# Patient Record
Sex: MALE | Race: WHITE | NOT HISPANIC OR LATINO | URBAN - METROPOLITAN AREA
[De-identification: names, ages, dates, MRNs, and addresses within clinical notes are randomized per-mention and may not be internally consistent; named-entity substitution may affect disease eponyms.]

---

## 2017-11-23 ENCOUNTER — EMERGENCY (EMERGENCY)
Facility: HOSPITAL | Age: 42
LOS: 1 days | Discharge: HOME | End: 2017-11-23
Attending: EMERGENCY MEDICINE | Admitting: EMERGENCY MEDICINE
Payer: COMMERCIAL

## 2017-11-23 VITALS
DIASTOLIC BLOOD PRESSURE: 93 MMHG | SYSTOLIC BLOOD PRESSURE: 152 MMHG | TEMPERATURE: 98 F | RESPIRATION RATE: 18 BRPM | HEART RATE: 98 BPM | OXYGEN SATURATION: 100 % | WEIGHT: 264.55 LBS

## 2017-11-23 DIAGNOSIS — R51 HEADACHE: ICD-10-CM

## 2017-11-23 DIAGNOSIS — R11.0 NAUSEA: ICD-10-CM

## 2017-11-23 PROCEDURE — 99284 EMERGENCY DEPT VISIT MOD MDM: CPT | Mod: 25

## 2017-11-23 PROCEDURE — 70450 CT HEAD/BRAIN W/O DYE: CPT | Mod: 26

## 2017-11-23 PROCEDURE — 70450 CT HEAD/BRAIN W/O DYE: CPT

## 2017-11-23 PROCEDURE — 99284 EMERGENCY DEPT VISIT MOD MDM: CPT

## 2017-11-23 NOTE — ED PROVIDER NOTE - MEDICAL DECISION MAKING DETAILS
41 yo male with int right sided headache ?visual aura.  normal exam.  pt requesting ct head.  does not want pain meds

## 2017-11-23 NOTE — ED ADULT TRIAGE NOTE - CHIEF COMPLAINT QUOTE
" I have a right sided headache for a month, getting worst and increasing in frequency and I have floaters".

## 2017-11-23 NOTE — ED ADULT NURSE NOTE - CHPI ED SYMPTOMS NEG
no dizziness/no weakness/no fever/no confusion/no numbness/no blurred vision/no loss of consciousness/no change in level of consciousness/no nausea/no vomiting

## 2017-11-23 NOTE — ED ADULT NURSE NOTE - OBJECTIVE STATEMENT
42y M, A&ox3, presents to ED c/o intermittent right sided headache over 1 month worsening. No head injury. No facial droop, no slurring of speech, strength and sensation equal bilateral. No cp, no sob, no fever, no chills. No photophobia, no neck tenderness. Will continue to monitor. 42y M, A&ox3, presents to ED c/o intermittent headache over 1 month worsening. No head injury. No facial droop, no slurring of speech, strength and sensation equal bilateral. No cp, no sob, no fever, no chills. No photophobia, no neck tenderness. Will continue to monitor. 42y M, A&ox3, presents to ED c/o intermittent right headache over 1 month worsening. Pt states at times sees "smokey" No blurred vision or changes in vision on arrival. No head injury. No facial droop, no slurring of speech, strength and sensation equal bilateral. No cp, no sob, no fever, no chills. No photophobia, no neck tenderness. NAD. Will continue to monitor.

## 2017-11-23 NOTE — ED PROVIDER NOTE - OBJECTIVE STATEMENT
right sided headache int for 1 month.  sometimes sees in right vision field looks like "smoke"  nausea, no vomiting

## 2017-11-26 ENCOUNTER — EMERGENCY (EMERGENCY)
Facility: HOSPITAL | Age: 42
LOS: 1 days | Discharge: HOME | End: 2017-11-26
Attending: EMERGENCY MEDICINE | Admitting: EMERGENCY MEDICINE
Payer: COMMERCIAL

## 2017-11-26 VITALS
RESPIRATION RATE: 18 BRPM | DIASTOLIC BLOOD PRESSURE: 96 MMHG | SYSTOLIC BLOOD PRESSURE: 143 MMHG | HEART RATE: 82 BPM | OXYGEN SATURATION: 98 %

## 2017-11-26 VITALS
WEIGHT: 265 LBS | RESPIRATION RATE: 18 BRPM | SYSTOLIC BLOOD PRESSURE: 168 MMHG | TEMPERATURE: 99 F | DIASTOLIC BLOOD PRESSURE: 112 MMHG | HEIGHT: 76 IN | OXYGEN SATURATION: 98 % | HEART RATE: 89 BPM

## 2017-11-26 DIAGNOSIS — Z79.899 OTHER LONG TERM (CURRENT) DRUG THERAPY: ICD-10-CM

## 2017-11-26 DIAGNOSIS — R51 HEADACHE: ICD-10-CM

## 2017-11-26 DIAGNOSIS — Z88.1 ALLERGY STATUS TO OTHER ANTIBIOTIC AGENTS STATUS: ICD-10-CM

## 2017-11-26 DIAGNOSIS — Z88.0 ALLERGY STATUS TO PENICILLIN: ICD-10-CM

## 2017-11-26 LAB
ALBUMIN SERPL ELPH-MCNC: 4.7 G/DL — SIGNIFICANT CHANGE UP (ref 3.3–5)
ALP SERPL-CCNC: 50 U/L — SIGNIFICANT CHANGE UP (ref 40–120)
ALT FLD-CCNC: 19 U/L — SIGNIFICANT CHANGE UP (ref 10–45)
ANION GAP SERPL CALC-SCNC: 16 MMOL/L — SIGNIFICANT CHANGE UP (ref 5–17)
AST SERPL-CCNC: 17 U/L — SIGNIFICANT CHANGE UP (ref 10–40)
BASOPHILS NFR BLD AUTO: 0.2 % — SIGNIFICANT CHANGE UP (ref 0–2)
BILIRUB SERPL-MCNC: 0.5 MG/DL — SIGNIFICANT CHANGE UP (ref 0.2–1.2)
BUN SERPL-MCNC: 13 MG/DL — SIGNIFICANT CHANGE UP (ref 7–23)
CALCIUM SERPL-MCNC: 9.4 MG/DL — SIGNIFICANT CHANGE UP (ref 8.4–10.5)
CHLORIDE SERPL-SCNC: 97 MMOL/L — SIGNIFICANT CHANGE UP (ref 96–108)
CO2 SERPL-SCNC: 26 MMOL/L — SIGNIFICANT CHANGE UP (ref 22–31)
CREAT SERPL-MCNC: 0.85 MG/DL — SIGNIFICANT CHANGE UP (ref 0.5–1.3)
EOSINOPHIL NFR BLD AUTO: 0.7 % — SIGNIFICANT CHANGE UP (ref 0–6)
GLUCOSE SERPL-MCNC: 93 MG/DL — SIGNIFICANT CHANGE UP (ref 70–99)
HCT VFR BLD CALC: 45 % — SIGNIFICANT CHANGE UP (ref 39–50)
HGB BLD-MCNC: 15.8 G/DL — SIGNIFICANT CHANGE UP (ref 13–17)
LYMPHOCYTES # BLD AUTO: 20.2 % — SIGNIFICANT CHANGE UP (ref 13–44)
MCHC RBC-ENTMCNC: 29.7 PG — SIGNIFICANT CHANGE UP (ref 27–34)
MCHC RBC-ENTMCNC: 35.1 G/DL — SIGNIFICANT CHANGE UP (ref 32–36)
MCV RBC AUTO: 84.6 FL — SIGNIFICANT CHANGE UP (ref 80–100)
MONOCYTES NFR BLD AUTO: 6.8 % — SIGNIFICANT CHANGE UP (ref 2–14)
NEUTROPHILS NFR BLD AUTO: 72.1 % — SIGNIFICANT CHANGE UP (ref 43–77)
PLATELET # BLD AUTO: 208 K/UL — SIGNIFICANT CHANGE UP (ref 150–400)
POTASSIUM SERPL-MCNC: 3.8 MMOL/L — SIGNIFICANT CHANGE UP (ref 3.5–5.3)
POTASSIUM SERPL-SCNC: 3.8 MMOL/L — SIGNIFICANT CHANGE UP (ref 3.5–5.3)
PROT SERPL-MCNC: 7.6 G/DL — SIGNIFICANT CHANGE UP (ref 6–8.3)
RBC # BLD: 5.32 M/UL — SIGNIFICANT CHANGE UP (ref 4.2–5.8)
RBC # FLD: 12.8 % — SIGNIFICANT CHANGE UP (ref 10.3–16.9)
SODIUM SERPL-SCNC: 139 MMOL/L — SIGNIFICANT CHANGE UP (ref 135–145)
WBC # BLD: 12.3 K/UL — HIGH (ref 3.8–10.5)
WBC # FLD AUTO: 12.3 K/UL — HIGH (ref 3.8–10.5)

## 2017-11-26 PROCEDURE — 36415 COLL VENOUS BLD VENIPUNCTURE: CPT

## 2017-11-26 PROCEDURE — 96375 TX/PRO/DX INJ NEW DRUG ADDON: CPT

## 2017-11-26 PROCEDURE — 99284 EMERGENCY DEPT VISIT MOD MDM: CPT | Mod: 25

## 2017-11-26 PROCEDURE — 80053 COMPREHEN METABOLIC PANEL: CPT

## 2017-11-26 PROCEDURE — 85025 COMPLETE CBC W/AUTO DIFF WBC: CPT

## 2017-11-26 PROCEDURE — 99284 EMERGENCY DEPT VISIT MOD MDM: CPT

## 2017-11-26 PROCEDURE — 96374 THER/PROPH/DIAG INJ IV PUSH: CPT

## 2017-11-26 RX ORDER — KETOROLAC TROMETHAMINE 30 MG/ML
30 SYRINGE (ML) INJECTION ONCE
Qty: 0 | Refills: 0 | Status: DISCONTINUED | OUTPATIENT
Start: 2017-11-26 | End: 2017-11-26

## 2017-11-26 RX ORDER — SODIUM CHLORIDE 9 MG/ML
1000 INJECTION INTRAMUSCULAR; INTRAVENOUS; SUBCUTANEOUS ONCE
Qty: 0 | Refills: 0 | Status: COMPLETED | OUTPATIENT
Start: 2017-11-26 | End: 2017-11-26

## 2017-11-26 RX ORDER — MAGNESIUM SULFATE 500 MG/ML
2 VIAL (ML) INJECTION ONCE
Qty: 0 | Refills: 0 | Status: COMPLETED | OUTPATIENT
Start: 2017-11-26 | End: 2017-11-26

## 2017-11-26 RX ADMIN — SODIUM CHLORIDE 1000 MILLILITER(S): 9 INJECTION INTRAMUSCULAR; INTRAVENOUS; SUBCUTANEOUS at 21:17

## 2017-11-26 RX ADMIN — Medication 50 GRAM(S): at 21:22

## 2017-11-26 RX ADMIN — Medication 30 MILLIGRAM(S): at 21:22

## 2017-11-26 NOTE — ED PROVIDER NOTE - MEDICAL DECISION MAKING DETAILS
41 yo male with right orbital headache x 2 days- recent ED visit 2 days ago - had neg CT- headache is pressure like -over right eye -no visual changes- no N/V neuro - no focal deficits - no neck stiffness- no fevers or chills  improved with oxygen hi flow and magnesium and Toradol - migraine / tension vs cluster  - smiling and alert and headache gone on dc  - rec neuro followup in 1-2 days

## 2017-11-26 NOTE — ED PROVIDER NOTE - OBJECTIVE STATEMENT
41 yo male with right sided headache - was seen in ED 3 days ago with sim headache now with pain over superior aspect of orbit  no fevers or chills no neck stiffness- no visual complaints no diplopia  no focal weakness  ? hx of cluster vs migraine headaches-  CT head in ED at [facility] 3 days ago was negative for acute path

## 2017-11-26 NOTE — ED ADULT TRIAGE NOTE - CHIEF COMPLAINT QUOTE
Patient came for rt eye pain for 5 days but today he noticed is  drooping .  Was here 3 days ago for the same reason .

## 2018-07-21 ENCOUNTER — EMERGENCY (EMERGENCY)
Facility: HOSPITAL | Age: 43
LOS: 1 days | Discharge: HOME | End: 2018-07-21
Attending: EMERGENCY MEDICINE | Admitting: EMERGENCY MEDICINE
Payer: COMMERCIAL

## 2018-07-21 VITALS
RESPIRATION RATE: 20 BRPM | DIASTOLIC BLOOD PRESSURE: 102 MMHG | HEIGHT: 76 IN | OXYGEN SATURATION: 100 % | WEIGHT: 240.08 LBS | SYSTOLIC BLOOD PRESSURE: 151 MMHG | HEART RATE: 80 BPM | TEMPERATURE: 99 F

## 2018-07-21 VITALS
TEMPERATURE: 98 F | RESPIRATION RATE: 18 BRPM | HEART RATE: 65 BPM | SYSTOLIC BLOOD PRESSURE: 140 MMHG | DIASTOLIC BLOOD PRESSURE: 89 MMHG | OXYGEN SATURATION: 99 %

## 2018-07-21 DIAGNOSIS — Z88.0 ALLERGY STATUS TO PENICILLIN: ICD-10-CM

## 2018-07-21 DIAGNOSIS — R19.7 DIARRHEA, UNSPECIFIED: ICD-10-CM

## 2018-07-21 DIAGNOSIS — R10.9 UNSPECIFIED ABDOMINAL PAIN: ICD-10-CM

## 2018-07-21 DIAGNOSIS — Z79.899 OTHER LONG TERM (CURRENT) DRUG THERAPY: ICD-10-CM

## 2018-07-21 DIAGNOSIS — Z88.1 ALLERGY STATUS TO OTHER ANTIBIOTIC AGENTS STATUS: ICD-10-CM

## 2018-07-21 LAB
ALBUMIN SERPL ELPH-MCNC: 4.5 G/DL — SIGNIFICANT CHANGE UP (ref 3.3–5)
ALP SERPL-CCNC: 48 U/L — SIGNIFICANT CHANGE UP (ref 40–120)
ALT FLD-CCNC: 16 U/L — SIGNIFICANT CHANGE UP (ref 10–45)
ANION GAP SERPL CALC-SCNC: 13 MMOL/L — SIGNIFICANT CHANGE UP (ref 5–17)
APTT BLD: 29.1 SEC — SIGNIFICANT CHANGE UP (ref 27.5–37.4)
AST SERPL-CCNC: 12 U/L — SIGNIFICANT CHANGE UP (ref 10–40)
BASOPHILS NFR BLD AUTO: 0.4 % — SIGNIFICANT CHANGE UP (ref 0–2)
BILIRUB SERPL-MCNC: 0.7 MG/DL — SIGNIFICANT CHANGE UP (ref 0.2–1.2)
BUN SERPL-MCNC: 9 MG/DL — SIGNIFICANT CHANGE UP (ref 7–23)
C DIFF GDH STL QL: NEGATIVE — SIGNIFICANT CHANGE UP
C DIFF GDH STL QL: SIGNIFICANT CHANGE UP
CALCIUM SERPL-MCNC: 9.1 MG/DL — SIGNIFICANT CHANGE UP (ref 8.4–10.5)
CHLORIDE SERPL-SCNC: 102 MMOL/L — SIGNIFICANT CHANGE UP (ref 96–108)
CO2 SERPL-SCNC: 27 MMOL/L — SIGNIFICANT CHANGE UP (ref 22–31)
CREAT SERPL-MCNC: 1.06 MG/DL — SIGNIFICANT CHANGE UP (ref 0.5–1.3)
EOSINOPHIL NFR BLD AUTO: 0.7 % — SIGNIFICANT CHANGE UP (ref 0–6)
GLUCOSE SERPL-MCNC: 108 MG/DL — HIGH (ref 70–99)
HCT VFR BLD CALC: 44 % — SIGNIFICANT CHANGE UP (ref 39–50)
HGB BLD-MCNC: 15.3 G/DL — SIGNIFICANT CHANGE UP (ref 13–17)
INR BLD: 1.02 — SIGNIFICANT CHANGE UP (ref 0.88–1.16)
LACTATE SERPL-SCNC: 1.7 MMOL/L — SIGNIFICANT CHANGE UP (ref 0.5–2)
LIDOCAIN IGE QN: 51 U/L — SIGNIFICANT CHANGE UP (ref 7–60)
LYMPHOCYTES # BLD AUTO: 22.2 % — SIGNIFICANT CHANGE UP (ref 13–44)
MCHC RBC-ENTMCNC: 30.2 PG — SIGNIFICANT CHANGE UP (ref 27–34)
MCHC RBC-ENTMCNC: 34.8 G/DL — SIGNIFICANT CHANGE UP (ref 32–36)
MCV RBC AUTO: 87 FL — SIGNIFICANT CHANGE UP (ref 80–100)
MONOCYTES NFR BLD AUTO: 5.9 % — SIGNIFICANT CHANGE UP (ref 2–14)
NEUTROPHILS NFR BLD AUTO: 70.8 % — SIGNIFICANT CHANGE UP (ref 43–77)
PLATELET # BLD AUTO: 197 K/UL — SIGNIFICANT CHANGE UP (ref 150–400)
POTASSIUM SERPL-MCNC: 3.8 MMOL/L — SIGNIFICANT CHANGE UP (ref 3.5–5.3)
POTASSIUM SERPL-SCNC: 3.8 MMOL/L — SIGNIFICANT CHANGE UP (ref 3.5–5.3)
PROT SERPL-MCNC: 7.2 G/DL — SIGNIFICANT CHANGE UP (ref 6–8.3)
PROTHROM AB SERPL-ACNC: 11.3 SEC — SIGNIFICANT CHANGE UP (ref 9.8–12.7)
RBC # BLD: 5.06 M/UL — SIGNIFICANT CHANGE UP (ref 4.2–5.8)
RBC # FLD: 13.2 % — SIGNIFICANT CHANGE UP (ref 10.3–16.9)
SODIUM SERPL-SCNC: 142 MMOL/L — SIGNIFICANT CHANGE UP (ref 135–145)
WBC # BLD: 5.4 K/UL — SIGNIFICANT CHANGE UP (ref 3.8–10.5)
WBC # FLD AUTO: 5.4 K/UL — SIGNIFICANT CHANGE UP (ref 3.8–10.5)

## 2018-07-21 PROCEDURE — 36415 COLL VENOUS BLD VENIPUNCTURE: CPT

## 2018-07-21 PROCEDURE — 85025 COMPLETE CBC W/AUTO DIFF WBC: CPT

## 2018-07-21 PROCEDURE — 85730 THROMBOPLASTIN TIME PARTIAL: CPT

## 2018-07-21 PROCEDURE — 83690 ASSAY OF LIPASE: CPT

## 2018-07-21 PROCEDURE — 99284 EMERGENCY DEPT VISIT MOD MDM: CPT

## 2018-07-21 PROCEDURE — 83605 ASSAY OF LACTIC ACID: CPT

## 2018-07-21 PROCEDURE — 80053 COMPREHEN METABOLIC PANEL: CPT

## 2018-07-21 PROCEDURE — 99283 EMERGENCY DEPT VISIT LOW MDM: CPT | Mod: 25

## 2018-07-21 PROCEDURE — 87324 CLOSTRIDIUM AG IA: CPT

## 2018-07-21 PROCEDURE — 87449 NOS EACH ORGANISM AG IA: CPT

## 2018-07-21 PROCEDURE — 87507 IADNA-DNA/RNA PROBE TQ 12-25: CPT

## 2018-07-21 PROCEDURE — 85610 PROTHROMBIN TIME: CPT

## 2018-07-21 RX ORDER — SODIUM CHLORIDE 9 MG/ML
2000 INJECTION INTRAMUSCULAR; INTRAVENOUS; SUBCUTANEOUS ONCE
Qty: 0 | Refills: 0 | Status: COMPLETED | OUTPATIENT
Start: 2018-07-21 | End: 2018-07-21

## 2018-07-21 RX ADMIN — SODIUM CHLORIDE 4000 MILLILITER(S): 9 INJECTION INTRAMUSCULAR; INTRAVENOUS; SUBCUTANEOUS at 09:29

## 2018-07-21 NOTE — ED PROVIDER NOTE - OBJECTIVE STATEMENT
43M with a h/o GERD and anxiety/depression who p/w diarrhea x 1 week, 1-3 episodes per day, stool is looser than normal, no mucous or blood, no fever, nausea or vomits. Pt ate a kale and chicken salad prior to sx onset. No sick contracts, recent travel or abx use. HE report shaving a colonoscopy this year with Dr. chavarria that was normal.

## 2018-07-21 NOTE — ED ADULT NURSE NOTE - OBJECTIVE STATEMENT
last week started having abdominal pain and intermittent diarrhea; ate "salads and chicken last Thursday" ; symptoms subsided; then returned ; has cramps and then a bout of diarrhea; then pain is relieved

## 2018-07-21 NOTE — ED ADULT NURSE NOTE - CHPI ED SYMPTOMS NEG
no burning urination/no blood in stool/no dysuria/no fever/no vomiting/no chills/no hematuria/no nausea

## 2018-07-21 NOTE — ED PROVIDER NOTE - MEDICAL DECISION MAKING DETAILS
Pt will diarrhea x 1 week, no abd pain, no fever. Exam unremarkable. Will check labs and hydrate. Stool sample ordered. reassess.

## 2018-07-21 NOTE — ED PROVIDER NOTE - PROGRESS NOTE DETAILS
Pt feels better, labs unremarkable, abd exam benign. stool sent to lab for analysis. Pt advised he would be called with results if positive. No indications for imaging or abx at this time based on labs and clinical exam.

## 2018-07-22 ENCOUNTER — EMERGENCY (EMERGENCY)
Facility: HOSPITAL | Age: 43
LOS: 1 days | Discharge: HOME | End: 2018-07-22
Attending: EMERGENCY MEDICINE | Admitting: EMERGENCY MEDICINE
Payer: COMMERCIAL

## 2018-07-22 VITALS
TEMPERATURE: 98 F | HEART RATE: 72 BPM | SYSTOLIC BLOOD PRESSURE: 127 MMHG | RESPIRATION RATE: 18 BRPM | DIASTOLIC BLOOD PRESSURE: 67 MMHG | OXYGEN SATURATION: 98 %

## 2018-07-22 VITALS
DIASTOLIC BLOOD PRESSURE: 98 MMHG | TEMPERATURE: 98 F | HEART RATE: 74 BPM | SYSTOLIC BLOOD PRESSURE: 143 MMHG | OXYGEN SATURATION: 99 % | RESPIRATION RATE: 18 BRPM | HEIGHT: 76 IN | WEIGHT: 240.08 LBS

## 2018-07-22 DIAGNOSIS — Z88.0 ALLERGY STATUS TO PENICILLIN: ICD-10-CM

## 2018-07-22 DIAGNOSIS — R10.31 RIGHT LOWER QUADRANT PAIN: ICD-10-CM

## 2018-07-22 DIAGNOSIS — Z79.899 OTHER LONG TERM (CURRENT) DRUG THERAPY: ICD-10-CM

## 2018-07-22 DIAGNOSIS — R10.32 LEFT LOWER QUADRANT PAIN: ICD-10-CM

## 2018-07-22 DIAGNOSIS — Z88.1 ALLERGY STATUS TO OTHER ANTIBIOTIC AGENTS STATUS: ICD-10-CM

## 2018-07-22 DIAGNOSIS — R19.7 DIARRHEA, UNSPECIFIED: ICD-10-CM

## 2018-07-22 PROBLEM — F41.9 ANXIETY DISORDER, UNSPECIFIED: Chronic | Status: ACTIVE | Noted: 2018-07-21

## 2018-07-22 PROBLEM — K21.9 GASTRO-ESOPHAGEAL REFLUX DISEASE WITHOUT ESOPHAGITIS: Chronic | Status: ACTIVE | Noted: 2018-07-21

## 2018-07-22 LAB
ALBUMIN SERPL ELPH-MCNC: 4.8 G/DL — SIGNIFICANT CHANGE UP (ref 3.3–5)
ALP SERPL-CCNC: 44 U/L — SIGNIFICANT CHANGE UP (ref 40–120)
ALT FLD-CCNC: 15 U/L — SIGNIFICANT CHANGE UP (ref 10–45)
ANION GAP SERPL CALC-SCNC: 13 MMOL/L — SIGNIFICANT CHANGE UP (ref 5–17)
AST SERPL-CCNC: 13 U/L — SIGNIFICANT CHANGE UP (ref 10–40)
BASOPHILS NFR BLD AUTO: 0.4 % — SIGNIFICANT CHANGE UP (ref 0–2)
BILIRUB SERPL-MCNC: 0.8 MG/DL — SIGNIFICANT CHANGE UP (ref 0.2–1.2)
BUN SERPL-MCNC: 9 MG/DL — SIGNIFICANT CHANGE UP (ref 7–23)
CALCIUM SERPL-MCNC: 9.6 MG/DL — SIGNIFICANT CHANGE UP (ref 8.4–10.5)
CHLORIDE SERPL-SCNC: 101 MMOL/L — SIGNIFICANT CHANGE UP (ref 96–108)
CO2 SERPL-SCNC: 26 MMOL/L — SIGNIFICANT CHANGE UP (ref 22–31)
CREAT SERPL-MCNC: 0.95 MG/DL — SIGNIFICANT CHANGE UP (ref 0.5–1.3)
EOSINOPHIL NFR BLD AUTO: 0.5 % — SIGNIFICANT CHANGE UP (ref 0–6)
GLUCOSE SERPL-MCNC: 106 MG/DL — HIGH (ref 70–99)
HCT VFR BLD CALC: 44.3 % — SIGNIFICANT CHANGE UP (ref 39–50)
HGB BLD-MCNC: 15.5 G/DL — SIGNIFICANT CHANGE UP (ref 13–17)
LIDOCAIN IGE QN: 48 U/L — SIGNIFICANT CHANGE UP (ref 7–60)
LYMPHOCYTES # BLD AUTO: 20.2 % — SIGNIFICANT CHANGE UP (ref 13–44)
MCHC RBC-ENTMCNC: 29.3 PG — SIGNIFICANT CHANGE UP (ref 27–34)
MCHC RBC-ENTMCNC: 35 G/DL — SIGNIFICANT CHANGE UP (ref 32–36)
MCV RBC AUTO: 83.7 FL — SIGNIFICANT CHANGE UP (ref 80–100)
MONOCYTES NFR BLD AUTO: 7.1 % — SIGNIFICANT CHANGE UP (ref 2–14)
NEUTROPHILS NFR BLD AUTO: 71.8 % — SIGNIFICANT CHANGE UP (ref 43–77)
PLATELET # BLD AUTO: 235 K/UL — SIGNIFICANT CHANGE UP (ref 150–400)
POTASSIUM SERPL-MCNC: 3.5 MMOL/L — SIGNIFICANT CHANGE UP (ref 3.5–5.3)
POTASSIUM SERPL-SCNC: 3.5 MMOL/L — SIGNIFICANT CHANGE UP (ref 3.5–5.3)
PROT SERPL-MCNC: 7.7 G/DL — SIGNIFICANT CHANGE UP (ref 6–8.3)
RBC # BLD: 5.29 M/UL — SIGNIFICANT CHANGE UP (ref 4.2–5.8)
RBC # FLD: 13 % — SIGNIFICANT CHANGE UP (ref 10.3–16.9)
SODIUM SERPL-SCNC: 140 MMOL/L — SIGNIFICANT CHANGE UP (ref 135–145)
WBC # BLD: 8.3 K/UL — SIGNIFICANT CHANGE UP (ref 3.8–10.5)
WBC # FLD AUTO: 8.3 K/UL — SIGNIFICANT CHANGE UP (ref 3.8–10.5)

## 2018-07-22 PROCEDURE — 80053 COMPREHEN METABOLIC PANEL: CPT

## 2018-07-22 PROCEDURE — 99284 EMERGENCY DEPT VISIT MOD MDM: CPT | Mod: 25

## 2018-07-22 PROCEDURE — 74177 CT ABD & PELVIS W/CONTRAST: CPT | Mod: 26

## 2018-07-22 PROCEDURE — 99284 EMERGENCY DEPT VISIT MOD MDM: CPT

## 2018-07-22 PROCEDURE — 83690 ASSAY OF LIPASE: CPT

## 2018-07-22 PROCEDURE — 74177 CT ABD & PELVIS W/CONTRAST: CPT

## 2018-07-22 PROCEDURE — 85025 COMPLETE CBC W/AUTO DIFF WBC: CPT

## 2018-07-22 RX ORDER — IOHEXOL 300 MG/ML
30 INJECTION, SOLUTION INTRAVENOUS ONCE
Qty: 0 | Refills: 0 | Status: COMPLETED | OUTPATIENT
Start: 2018-07-22 | End: 2018-07-22

## 2018-07-22 RX ADMIN — IOHEXOL 30 MILLILITER(S): 300 INJECTION, SOLUTION INTRAVENOUS at 16:18

## 2018-07-22 NOTE — ED ADULT NURSE NOTE - OBJECTIVE STATEMENT
continued abdominal pain radiating to sides ; and continued diarrhea ; eating brats diet and " I think I have worms" "There are little black things "in BM

## 2018-07-22 NOTE — ED ADULT NURSE NOTE - CHPI ED SYMPTOMS NEG
no fever/no dysuria/no hematuria/no nausea/no vomiting/no burning urination/no chills/no blood in stool

## 2018-07-22 NOTE — ED ADULT TRIAGE NOTE - OTHER COMPLAINTS
Pt C/O lower abdominal pain radiating to lower back, nausea and diarrhea since Wednesday. Pt also reports "food poisoning" on last Saturday. Pt denies urinary symptom, vomiting, fever, chest pain, SOB, dizziness.

## 2018-07-22 NOTE — ED PROVIDER NOTE - MEDICAL DECISION MAKING DETAILS
42 yo male with continued diarrhea, and now abd pain.  no fever.  will get ct scan, repeat labs.  pt declines pain meds

## 2018-07-22 NOTE — ED PROVIDER NOTE - OBJECTIVE STATEMENT
a few days of diarrhea, was here for that 2 days ago, had normal labs  today started developing bilateral lower abd pain and still having diarrhea, non-bloody.  no fevers  denies sick contacts, travel  able to eat

## 2018-07-23 LAB
CULTURE RESULTS: SIGNIFICANT CHANGE UP
SPECIMEN SOURCE: SIGNIFICANT CHANGE UP

## 2018-07-24 RX ORDER — MOXIFLOXACIN HYDROCHLORIDE TABLETS, 400 MG 400 MG/1
1 TABLET, FILM COATED ORAL
Qty: 20 | Refills: 0 | OUTPATIENT
Start: 2018-07-24 | End: 2018-08-02

## 2018-07-25 NOTE — ED POST DISCHARGE NOTE - ADDITIONAL DOCUMENTATION
Pt called to discuss his ecoli results - discussed supportive care, appropriateness of cipro treatment if pt is going to use abx and reasons for return; encouraged pt to fu w pmd/gi.

## 2018-08-08 ENCOUNTER — INPATIENT (INPATIENT)
Facility: HOSPITAL | Age: 43
LOS: 1 days | Discharge: HOME | DRG: 885 | End: 2018-08-10
Attending: STUDENT IN AN ORGANIZED HEALTH CARE EDUCATION/TRAINING PROGRAM | Admitting: STUDENT IN AN ORGANIZED HEALTH CARE EDUCATION/TRAINING PROGRAM
Payer: COMMERCIAL

## 2018-08-08 VITALS
SYSTOLIC BLOOD PRESSURE: 133 MMHG | TEMPERATURE: 98 F | RESPIRATION RATE: 20 BRPM | WEIGHT: 244.71 LBS | HEART RATE: 85 BPM | OXYGEN SATURATION: 96 % | DIASTOLIC BLOOD PRESSURE: 92 MMHG

## 2018-08-08 DIAGNOSIS — F33.1 MAJOR DEPRESSIVE DISORDER, RECURRENT, MODERATE: ICD-10-CM

## 2018-08-08 LAB
ALBUMIN SERPL ELPH-MCNC: 5 G/DL — SIGNIFICANT CHANGE UP (ref 3.3–5)
ALP SERPL-CCNC: 49 U/L — SIGNIFICANT CHANGE UP (ref 40–120)
ALT FLD-CCNC: 21 U/L — SIGNIFICANT CHANGE UP (ref 10–45)
ANION GAP SERPL CALC-SCNC: 14 MMOL/L — SIGNIFICANT CHANGE UP (ref 5–17)
APAP SERPL-MCNC: <15 UG/ML — SIGNIFICANT CHANGE UP (ref 10–30)
APPEARANCE UR: CLEAR — SIGNIFICANT CHANGE UP
AST SERPL-CCNC: 17 U/L — SIGNIFICANT CHANGE UP (ref 10–40)
BASOPHILS NFR BLD AUTO: 0.2 % — SIGNIFICANT CHANGE UP (ref 0–2)
BILIRUB SERPL-MCNC: 0.7 MG/DL — SIGNIFICANT CHANGE UP (ref 0.2–1.2)
BILIRUB UR-MCNC: NEGATIVE — SIGNIFICANT CHANGE UP
BUN SERPL-MCNC: 10 MG/DL — SIGNIFICANT CHANGE UP (ref 7–23)
CALCIUM SERPL-MCNC: 9.6 MG/DL — SIGNIFICANT CHANGE UP (ref 8.4–10.5)
CHLORIDE SERPL-SCNC: 101 MMOL/L — SIGNIFICANT CHANGE UP (ref 96–108)
CO2 SERPL-SCNC: 27 MMOL/L — SIGNIFICANT CHANGE UP (ref 22–31)
COLOR SPEC: YELLOW — SIGNIFICANT CHANGE UP
CREAT SERPL-MCNC: 1.01 MG/DL — SIGNIFICANT CHANGE UP (ref 0.5–1.3)
DIFF PNL FLD: NEGATIVE — SIGNIFICANT CHANGE UP
EOSINOPHIL NFR BLD AUTO: 0.8 % — SIGNIFICANT CHANGE UP (ref 0–6)
ETHANOL SERPL-MCNC: <10 MG/DL — SIGNIFICANT CHANGE UP (ref 0–10)
GLUCOSE SERPL-MCNC: 101 MG/DL — HIGH (ref 70–99)
GLUCOSE UR QL: NEGATIVE — SIGNIFICANT CHANGE UP
HCT VFR BLD CALC: 44.6 % — SIGNIFICANT CHANGE UP (ref 39–50)
HGB BLD-MCNC: 15.3 G/DL — SIGNIFICANT CHANGE UP (ref 13–17)
KETONES UR-MCNC: NEGATIVE — SIGNIFICANT CHANGE UP
LEUKOCYTE ESTERASE UR-ACNC: NEGATIVE — SIGNIFICANT CHANGE UP
LYMPHOCYTES # BLD AUTO: 27.4 % — SIGNIFICANT CHANGE UP (ref 13–44)
MCHC RBC-ENTMCNC: 29.3 PG — SIGNIFICANT CHANGE UP (ref 27–34)
MCHC RBC-ENTMCNC: 34.3 G/DL — SIGNIFICANT CHANGE UP (ref 32–36)
MCV RBC AUTO: 85.3 FL — SIGNIFICANT CHANGE UP (ref 80–100)
MONOCYTES NFR BLD AUTO: 8.5 % — SIGNIFICANT CHANGE UP (ref 2–14)
NEUTROPHILS NFR BLD AUTO: 63.1 % — SIGNIFICANT CHANGE UP (ref 43–77)
NITRITE UR-MCNC: NEGATIVE — SIGNIFICANT CHANGE UP
PCP SPEC-MCNC: SIGNIFICANT CHANGE UP
PH UR: 7 — SIGNIFICANT CHANGE UP (ref 5–8)
PLATELET # BLD AUTO: 239 K/UL — SIGNIFICANT CHANGE UP (ref 150–400)
POTASSIUM SERPL-MCNC: 3.9 MMOL/L — SIGNIFICANT CHANGE UP (ref 3.5–5.3)
POTASSIUM SERPL-SCNC: 3.9 MMOL/L — SIGNIFICANT CHANGE UP (ref 3.5–5.3)
PROT SERPL-MCNC: 7.2 G/DL — SIGNIFICANT CHANGE UP (ref 6–8.3)
PROT UR-MCNC: NEGATIVE MG/DL — SIGNIFICANT CHANGE UP
RBC # BLD: 5.23 M/UL — SIGNIFICANT CHANGE UP (ref 4.2–5.8)
RBC # FLD: 13.3 % — SIGNIFICANT CHANGE UP (ref 10.3–16.9)
SALICYLATES SERPL-MCNC: <0.3 MG/DL — LOW (ref 2.8–20)
SODIUM SERPL-SCNC: 142 MMOL/L — SIGNIFICANT CHANGE UP (ref 135–145)
SP GR SPEC: 1.01 — SIGNIFICANT CHANGE UP (ref 1–1.03)
TSH SERPL-MCNC: 0.92 UIU/ML — SIGNIFICANT CHANGE UP (ref 0.35–4.94)
UROBILINOGEN FLD QL: 0.2 E.U./DL — SIGNIFICANT CHANGE UP
WBC # BLD: 6.2 K/UL — SIGNIFICANT CHANGE UP (ref 3.8–10.5)
WBC # FLD AUTO: 6.2 K/UL — SIGNIFICANT CHANGE UP (ref 3.8–10.5)

## 2018-08-08 PROCEDURE — 99285 EMERGENCY DEPT VISIT HI MDM: CPT

## 2018-08-08 PROCEDURE — 90792 PSYCH DIAG EVAL W/MED SRVCS: CPT

## 2018-08-08 RX ORDER — PANTOPRAZOLE SODIUM 20 MG/1
40 TABLET, DELAYED RELEASE ORAL
Qty: 0 | Refills: 0 | Status: DISCONTINUED | OUTPATIENT
Start: 2018-08-08 | End: 2018-08-10

## 2018-08-08 RX ORDER — ESCITALOPRAM OXALATE 10 MG/1
30 TABLET, FILM COATED ORAL DAILY
Qty: 0 | Refills: 0 | Status: DISCONTINUED | OUTPATIENT
Start: 2018-08-08 | End: 2018-08-10

## 2018-08-08 RX ORDER — HALOPERIDOL DECANOATE 100 MG/ML
5 INJECTION INTRAMUSCULAR EVERY 4 HOURS
Qty: 0 | Refills: 0 | Status: DISCONTINUED | OUTPATIENT
Start: 2018-08-08 | End: 2018-08-10

## 2018-08-08 RX ORDER — ACETAMINOPHEN 500 MG
650 TABLET ORAL EVERY 6 HOURS
Qty: 0 | Refills: 0 | Status: DISCONTINUED | OUTPATIENT
Start: 2018-08-08 | End: 2018-08-10

## 2018-08-08 RX ORDER — OLANZAPINE 15 MG/1
0 TABLET, FILM COATED ORAL
Qty: 0 | Refills: 0 | COMMUNITY

## 2018-08-08 NOTE — ED BEHAVIORAL HEALTH ASSESSMENT NOTE - HPI (INCLUDE ILLNESS QUALITY, SEVERITY, DURATION, TIMING, CONTEXT, MODIFYING FACTORS, ASSOCIATED SIGNS AND SYMPTOMS)
43M PPH depression, no prior psychiatric admissions or suicide attempts, PMH recent diarrheal illness, BIB self reporting worsening depression and passive SI.  Patient reports that for the last 1-2 weeks he has been feeling that he no longer wants to be alive and wishes he could go to sleep and not wake up.  This in the context of stress at work, being reminded of deaths in his family due to needing to sell their property, and this recent diarrheal illness that has persisted (patient was seen in ED last month).  Says he increased Lexapro from 10 to 20 mg PO daily 1.5 weeks ago.  Sees his psychiatrist every 6 months, last saw her 5 months ago.  Recently started CBT with a therapist but doesn't remember her name.  Patient denies active SI, intent, or plan.  Denies history of suicide attempt.  Denies past history of manic symptoms, AH/VH, HI.

## 2018-08-08 NOTE — ED ADULT NURSE NOTE - OBJECTIVE STATEMENT
Pt reports SI for the past week, denies HI, denies etoh/drug use, hallucinations, or a plan for suicide. Pt reports recent stressors and past depression, however has felt like "using pills or something so that it's not painful." 1:1 suicide precautions maintained.

## 2018-08-08 NOTE — ED BEHAVIORAL HEALTH ASSESSMENT NOTE - RISK ASSESSMENT
Patient at acutely elevated risk of harm to self given reports of new onset and worsening passive SI

## 2018-08-08 NOTE — ED ADULT NURSE NOTE - NSIMPLEMENTINTERV_GEN_ALL_ED
Implemented All Universal Safety Interventions:  [place] to call system. Call bell, personal items and telephone within reach. Instruct patient to call for assistance. Room bathroom lighting operational. Non-slip footwear when patient is off stretcher. Physically safe environment: no spills, clutter or unnecessary equipment. Stretcher in lowest position, wheels locked, appropriate side rails in place.

## 2018-08-08 NOTE — ED BEHAVIORAL HEALTH ASSESSMENT NOTE - DETAILS
wanst to be dead father with bipolar disorder, mother with depression, no family history of suicide deferred persistent diarrhea 8Uris n/a

## 2018-08-08 NOTE — ED BEHAVIORAL HEALTH ASSESSMENT NOTE - MEDICAL ISSUES AND PLAN (INCLUDE STANDING AND PRN MEDICATION)
diarrheal illness: can consider consulting ID if aptient's diarrhea worsens.  encourage PO fluid intake

## 2018-08-08 NOTE — ED PROVIDER NOTE - OBJECTIVE STATEMENT
The pt is a 44 y/o M, who presents to ED stating "i'm very depressed" x 1 wk. Pt states no desire to live, wants to die, states no plan and if he thinks about it, he does not actually wish to harm himself, but feels like can't function, + insomnia, + loss of interest, + loss of appetite, on lexapro and has doubled his dose "trying to help myself". Has had some recent gi issues - had colonoscopy, endoscopy, stool studies (e coli not c dif)  No suicide attempts in past. Denies cp, sob, n/v/d, abd pain, dizziness, fevers, chills

## 2018-08-08 NOTE — ED PROVIDER NOTE - MEDICAL DECISION MAKING DETAILS
pt w/depression and si (no plan), taking lexapro - doubled his own dose in attempt to help himself but no improvement , pt medically cleared for psych admission (the e coli stool culture noted - universal hand hygiene precautions only, no isolation or abx indicated)

## 2018-08-08 NOTE — ED BEHAVIORAL HEALTH ASSESSMENT NOTE - SUMMARY
43M h/o depression, no prior psych admissions, presenting reporting recently worsening depression and passive SI.  Patient with infrequent psychiatric follow up and very new CBT treatment.  [place] meets criteria for voluntary psychiatric admission as he doesn't feel safe at home.

## 2018-08-08 NOTE — ED PROVIDER NOTE - ATTENDING CONTRIBUTION TO CARE
The pt is a 42 y/o M, who presents to ED stating "i'm very depressed" x 1 wk. Pt states no desire to live, wants to die, states no plan and if he thinks about it, he does not actually wish to harm himself, but feels like can't function, + insomnia, + loss of interest, + loss of appetite, on lexapro and has doubled his dose "trying to help myself". Has had some recent gi issues - had colonoscopy, endoscopy, stool studies (e coli not c dif)  No suicide attempts in past. Denies cp, sob, n/v/d, abd pain, dizziness, fevers, chills    PE - pt on 1:1 and medical clearance done, pt to be admitted to psych for further mgmt

## 2018-08-08 NOTE — ED BEHAVIORAL HEALTH ASSESSMENT NOTE - DESCRIPTION
calm and cooperative diarrheal illness recently Lives with , works, no children, has been going to work

## 2018-08-09 DIAGNOSIS — F41.9 ANXIETY DISORDER, UNSPECIFIED: ICD-10-CM

## 2018-08-09 DIAGNOSIS — K21.9 GASTRO-ESOPHAGEAL REFLUX DISEASE WITHOUT ESOPHAGITIS: ICD-10-CM

## 2018-08-09 PROCEDURE — 85025 COMPLETE CBC W/AUTO DIFF WBC: CPT

## 2018-08-09 PROCEDURE — 99285 EMERGENCY DEPT VISIT HI MDM: CPT

## 2018-08-09 PROCEDURE — 36415 COLL VENOUS BLD VENIPUNCTURE: CPT

## 2018-08-09 PROCEDURE — 80307 DRUG TEST PRSMV CHEM ANLYZR: CPT

## 2018-08-09 PROCEDURE — 81003 URINALYSIS AUTO W/O SCOPE: CPT

## 2018-08-09 PROCEDURE — 80053 COMPREHEN METABOLIC PANEL: CPT

## 2018-08-09 PROCEDURE — 84443 ASSAY THYROID STIM HORMONE: CPT

## 2018-08-09 PROCEDURE — 83036 HEMOGLOBIN GLYCOSYLATED A1C: CPT

## 2018-08-09 PROCEDURE — 99223 1ST HOSP IP/OBS HIGH 75: CPT

## 2018-08-09 RX ADMIN — ESCITALOPRAM OXALATE 30 MILLIGRAM(S): 10 TABLET, FILM COATED ORAL at 22:15

## 2018-08-09 RX ADMIN — PANTOPRAZOLE SODIUM 40 MILLIGRAM(S): 20 TABLET, DELAYED RELEASE ORAL at 06:50

## 2018-08-09 NOTE — BEHAVIORAL HEALTH ASSESSMENT NOTE - NSBHADMITCOUNSEL_PSY_A_CORE
diagnostic results/impressions and/or recommended studies/risks and benefits of treatment options/instructions for management, treatment and follow up/client/family/caregiver education/importance of adherence to chosen treatment/risk factor reduction

## 2018-08-09 NOTE — BEHAVIORAL HEALTH ASSESSMENT NOTE - DETAILS
persistent diarrhea father with bipolar disorder, mother with depression, no family history of suicide

## 2018-08-09 NOTE — BEHAVIORAL HEALTH ASSESSMENT NOTE - NSBHCHARTREVIEWLAB_PSY_A_CORE FT
15.3                 142  | 27   | 10           6.2   >-----------< 239     ------------------------< 101                   44.6                 3.9  | 101  | 1.01                                         Ca 9.6   Mg x     Ph x      08-08 [id number] 5.2

## 2018-08-09 NOTE — BEHAVIORAL HEALTH ASSESSMENT NOTE - NSBHSUICPROTECTFACT_PSY_A_CORE
Identifies reasons for living/Future oriented/Supportive social network or family/Engaged in work or school/Positive therapeutic relationships/Responsibility to family and others

## 2018-08-09 NOTE — BEHAVIORAL HEALTH ASSESSMENT NOTE - SUMMARY
43M h/o depression, no prior psych admissions, presenting reporting recently worsening depression and passive SI.  Patient with infrequent psychiatric follow up and very new CBT treatment.

## 2018-08-09 NOTE — BEHAVIORAL HEALTH ASSESSMENT NOTE - NSBHCHARTREVIEWVS_PSY_A_CORE FT
Vital Signs Last 24 Hrs  T(C): 37.2 (09 Aug 2018 09:10), Max: 37.2 (09 Aug 2018 09:10)  T(F): 98.9 (09 Aug 2018 09:10), Max: 98.9 (09 Aug 2018 09:10)  HR: 84 (09 Aug 2018 09:10) (84 - 84)  BP: 132/88 (09 Aug 2018 09:10) (132/88 - 132/88)  BP(mean): --  RR: 20 (09 Aug 2018 09:10) (20 - 20)  SpO2: --

## 2018-08-09 NOTE — BEHAVIORAL HEALTH ASSESSMENT NOTE - NSBHADMITIPSTRENGTH_PSY_A_CORE
Able to manage surrounding demands/opportunities/Has supportive interpersonal relationships with family, friends or peers/Cooperative with treatment/Motivated and ready for change/Able to set and pursue goals/Has access to housing/residential stability/Intelligent/Attempting to realize his/her potential/Financial stability/Steady employment

## 2018-08-09 NOTE — BEHAVIORAL HEALTH ASSESSMENT NOTE - HPI (INCLUDE ILLNESS QUALITY, SEVERITY, DURATION, TIMING, CONTEXT, MODIFYING FACTORS, ASSOCIATED SIGNS AND SYMPTOMS)
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 He reports long history of anxiety and depression, currently in the care of a psychiatrist and is prescribed Lexapro. Also recently began CBT 3 weeks ago. No prior psychiatric admissions, no history of suicide attempts. No legal issues. Med history significant for GERD and IBS. Patient presents to ED with c/o worsening depression and passive SI, no intent or plan in context of diarrhea x1.5 weeks.     Patient reports that he has been seeing current psychiatrist x7-8 years every 6 months for med management. Was originally on Prozac x4 years until it pooped out. Has been on Lexapro 10mg for 4 years. He reports that most recently he contracted food poisoning and has e. coli. He has been having diarrhea x1.5 weeks which has increased his anxiety. He reports that his psychiatrist recommended that he increase dosage to 15mg on occasions when he is more anxious. He increased it to 20mg over 1 week ago, but has had no changes in the level of his anxiety. Additionally he reports that another stressor in addition to the food poisoning is him and his family putting his  grandmother's apartment on the market for sale. He has been having passive SI x5 days "I just want to lay down and not wake up' due to his frustration re his ongoing diarrhea. He states that overall the episodes of diarrhea have decreased from 3+ to 1-2/day. He denies current si/hi, no a/v h or any history of. No hx of francine. No drug/substance/cigarette use.     He submitted a 72 hour letter on 18 requesting referral for a new psychiatrist prior to discharge. He reports since being on the unit his level of anxiety has decreased. He would like to continue seeing his new therapist.

## 2018-08-09 NOTE — BEHAVIORAL HEALTH ASSESSMENT NOTE - RISK ASSESSMENT
Static: mental illness  Modifiable: mood episode, access to med/treatment  Protective: future oriented, , job, apartment, insight/judgment

## 2018-08-10 VITALS
RESPIRATION RATE: 18 BRPM | HEART RATE: 87 BPM | DIASTOLIC BLOOD PRESSURE: 98 MMHG | TEMPERATURE: 98 F | SYSTOLIC BLOOD PRESSURE: 152 MMHG

## 2018-08-10 PROCEDURE — 99238 HOSP IP/OBS DSCHRG MGMT 30/<: CPT

## 2018-08-10 RX ORDER — ESCITALOPRAM OXALATE 10 MG/1
3 TABLET, FILM COATED ORAL
Qty: 42 | Refills: 0
Start: 2018-08-10 | End: 2018-08-23

## 2018-08-10 RX ORDER — ESCITALOPRAM OXALATE 10 MG/1
3 TABLET, FILM COATED ORAL
Qty: 0 | Refills: 0 | COMMUNITY
Start: 2018-08-10

## 2018-08-10 RX ORDER — PANTOPRAZOLE SODIUM 20 MG/1
1 TABLET, DELAYED RELEASE ORAL
Qty: 14 | Refills: 0
Start: 2018-08-10 | End: 2018-08-23

## 2018-08-10 RX ORDER — OMEPRAZOLE 10 MG/1
0 CAPSULE, DELAYED RELEASE ORAL
Qty: 0 | Refills: 0 | COMMUNITY

## 2018-08-10 NOTE — PROGRESS NOTE BEHAVIORAL HEALTH - NSBHCHARTREVIEWVS_PSY_A_CORE FT
Vital Signs Last 24 Hrs  T(C): 36.7 (10 Aug 2018 10:00), Max: 36.9 (09 Aug 2018 16:50)  T(F): 98 (10 Aug 2018 10:00), Max: 98.5 (09 Aug 2018 16:50)  HR: 87 (10 Aug 2018 10:00) (87 - 90)  BP: 152/98 (10 Aug 2018 10:00) (152/98 - 155/89)  BP(mean): --  RR: 18 (10 Aug 2018 10:00) (18 - 18)  SpO2: --

## 2018-08-10 NOTE — PROGRESS NOTE BEHAVIORAL HEALTH - NSBHFUPINTERVALHXFT_PSY_A_CORE
Patient is visible on the unit, anxious for discharge. He reports feeling happy that he was able to get an appointment for the weekend with a psychiatrist as he is looking forward to discussing a new regimen with them. He is future oriented, and motivated for continued treatment. No si/hi, no a/v h or paranoia. No acute medical concerns reported.

## 2018-08-10 NOTE — PROGRESS NOTE BEHAVIORAL HEALTH - ORIENTED TO PERSON
[facility] PRE-ADMISSION TESTING INSTRUCTIONS    The Preadmission Testing patient is instructed accordingly using the following criteria (check applicable):    ARRIVAL INSTRUCTIONS:  [x] Parking the day of Surgery is located in the Main Entrance lot. Upon entering the door, make an immediate right to the surgery reception desk    [x] Bring photo ID and insurance card    [] Bring in a copy of Living will or Durable Power of  papers. [x] Please be sure to arrange transportation to and from the hospital    [x] Please arrange for someone to be with you the remainder of the day due to having anesthesia      GENERAL INSTRUCTIONS:    [x] Nothing by mouth after midnight, including gum, candy, mints or water    [x] You may brush your teeth, but do not swallow any water    [x] Take medications as instructed with 1-2 oz of water    [x] Stop herbal supplements and vitamins 5 days prior to procedure    [x] Follow preop dosing of blood thinners per physician instructions    [] Do not take insulin or oral diabetic medications    [] If diabetic and have low blood sugar or feel symptomatic, take 1-2oz apple juice or glucose tablets    [] Bring inhalers day of surgery    [] Bring C-PAP/ Bi-Pap day of surgery    [] Bring urine specimen day of surgery    [] Antibacterial Soap shower or bath AM of Surgery, no lotion, powders or creams to surgical site    [] Follow bowel prep as instructed per surgeon    [x] No tobacco products within 24 hours of surgery     [x] No alcohol or illegal drug use within 24 hours of surgery.     [x] Jewelry, body piercing's, eyeglasses, contact lenses and dentures are not permitted into surgery (bring cases)      [x] Please do not wear any nail polish or make up on the day of surgery    [x] If not already done, you can expect a call from registration    [x] If surgeon requests a time change you will be notified the day prior to surgery    [x] If you receive a survey after
Yes

## 2018-08-10 NOTE — PROGRESS NOTE BEHAVIORAL HEALTH - NSBHCHARTREVIEWLAB_PSY_A_CORE FT
15.3                 142  | 27   | 10           6.2   >-----------< 239     ------------------------< 101                   44.6                 3.9  | 101  | 1.01                                         Ca 9.6   Mg x     Ph x      08-08 [id number] 5.2

## 2018-08-10 NOTE — PROGRESS NOTE BEHAVIORAL HEALTH - NSBHADMITCOUNSEL_PSY_A_CORE
risks and benefits of treatment options/instructions for management, treatment and follow up/risk factor reduction/client/family/caregiver education/diagnostic results/impressions and/or recommended studies/importance of adherence to chosen treatment

## 2018-08-14 DIAGNOSIS — F33.1 MAJOR DEPRESSIVE DISORDER, RECURRENT, MODERATE: ICD-10-CM

## 2018-08-14 DIAGNOSIS — K21.9 GASTRO-ESOPHAGEAL REFLUX DISEASE WITHOUT ESOPHAGITIS: ICD-10-CM

## 2018-08-14 DIAGNOSIS — F41.1 GENERALIZED ANXIETY DISORDER: ICD-10-CM

## 2018-08-14 DIAGNOSIS — K58.9 IRRITABLE BOWEL SYNDROME WITHOUT DIARRHEA: ICD-10-CM

## 2018-08-14 DIAGNOSIS — Z88.0 ALLERGY STATUS TO PENICILLIN: ICD-10-CM

## 2018-09-06 ENCOUNTER — EMERGENCY (EMERGENCY)
Facility: HOSPITAL | Age: 43
LOS: 1 days | Discharge: HOME | End: 2018-09-06
Attending: EMERGENCY MEDICINE | Admitting: EMERGENCY MEDICINE
Payer: COMMERCIAL

## 2018-09-06 VITALS
TEMPERATURE: 99 F | RESPIRATION RATE: 18 BRPM | OXYGEN SATURATION: 100 % | SYSTOLIC BLOOD PRESSURE: 132 MMHG | HEART RATE: 75 BPM | DIASTOLIC BLOOD PRESSURE: 78 MMHG

## 2018-09-06 VITALS
HEART RATE: 84 BPM | WEIGHT: 242.51 LBS | SYSTOLIC BLOOD PRESSURE: 145 MMHG | TEMPERATURE: 99 F | RESPIRATION RATE: 20 BRPM | OXYGEN SATURATION: 98 % | DIASTOLIC BLOOD PRESSURE: 97 MMHG

## 2018-09-06 DIAGNOSIS — R10.9 UNSPECIFIED ABDOMINAL PAIN: ICD-10-CM

## 2018-09-06 DIAGNOSIS — Z88.0 ALLERGY STATUS TO PENICILLIN: ICD-10-CM

## 2018-09-06 DIAGNOSIS — R19.7 DIARRHEA, UNSPECIFIED: ICD-10-CM

## 2018-09-06 DIAGNOSIS — F41.9 ANXIETY DISORDER, UNSPECIFIED: ICD-10-CM

## 2018-09-06 DIAGNOSIS — Z88.1 ALLERGY STATUS TO OTHER ANTIBIOTIC AGENTS STATUS: ICD-10-CM

## 2018-09-06 DIAGNOSIS — K59.8 OTHER SPECIFIED FUNCTIONAL INTESTINAL DISORDERS: ICD-10-CM

## 2018-09-06 DIAGNOSIS — Z79.899 OTHER LONG TERM (CURRENT) DRUG THERAPY: ICD-10-CM

## 2018-09-06 LAB
ALBUMIN SERPL ELPH-MCNC: 4.8 G/DL — SIGNIFICANT CHANGE UP (ref 3.3–5)
ALP SERPL-CCNC: 47 U/L — SIGNIFICANT CHANGE UP (ref 40–120)
ALT FLD-CCNC: 13 U/L — SIGNIFICANT CHANGE UP (ref 10–45)
ANION GAP SERPL CALC-SCNC: 13 MMOL/L — SIGNIFICANT CHANGE UP (ref 5–17)
APPEARANCE UR: CLEAR — SIGNIFICANT CHANGE UP
AST SERPL-CCNC: 12 U/L — SIGNIFICANT CHANGE UP (ref 10–40)
BASOPHILS NFR BLD AUTO: 0.7 % — SIGNIFICANT CHANGE UP (ref 0–2)
BILIRUB SERPL-MCNC: 0.6 MG/DL — SIGNIFICANT CHANGE UP (ref 0.2–1.2)
BILIRUB UR-MCNC: NEGATIVE — SIGNIFICANT CHANGE UP
BUN SERPL-MCNC: 9 MG/DL — SIGNIFICANT CHANGE UP (ref 7–23)
CALCIUM SERPL-MCNC: 9.8 MG/DL — SIGNIFICANT CHANGE UP (ref 8.4–10.5)
CHLORIDE SERPL-SCNC: 103 MMOL/L — SIGNIFICANT CHANGE UP (ref 96–108)
CO2 SERPL-SCNC: 27 MMOL/L — SIGNIFICANT CHANGE UP (ref 22–31)
COLOR SPEC: YELLOW — SIGNIFICANT CHANGE UP
CREAT SERPL-MCNC: 0.97 MG/DL — SIGNIFICANT CHANGE UP (ref 0.5–1.3)
DIFF PNL FLD: NEGATIVE — SIGNIFICANT CHANGE UP
EOSINOPHIL NFR BLD AUTO: 0.9 % — SIGNIFICANT CHANGE UP (ref 0–6)
GLUCOSE SERPL-MCNC: 101 MG/DL — HIGH (ref 70–99)
GLUCOSE UR QL: NEGATIVE — SIGNIFICANT CHANGE UP
HCT VFR BLD CALC: 43.7 % — SIGNIFICANT CHANGE UP (ref 39–50)
HGB BLD-MCNC: 15.2 G/DL — SIGNIFICANT CHANGE UP (ref 13–17)
KETONES UR-MCNC: NEGATIVE — SIGNIFICANT CHANGE UP
LEUKOCYTE ESTERASE UR-ACNC: NEGATIVE — SIGNIFICANT CHANGE UP
LIDOCAIN IGE QN: 53 U/L — SIGNIFICANT CHANGE UP (ref 7–60)
LYMPHOCYTES # BLD AUTO: 19 % — SIGNIFICANT CHANGE UP (ref 13–44)
MCHC RBC-ENTMCNC: 29.8 PG — SIGNIFICANT CHANGE UP (ref 27–34)
MCHC RBC-ENTMCNC: 34.8 G/DL — SIGNIFICANT CHANGE UP (ref 32–36)
MCV RBC AUTO: 85.7 FL — SIGNIFICANT CHANGE UP (ref 80–100)
MONOCYTES NFR BLD AUTO: 7.2 % — SIGNIFICANT CHANGE UP (ref 2–14)
NEUTROPHILS NFR BLD AUTO: 72.2 % — SIGNIFICANT CHANGE UP (ref 43–77)
NITRITE UR-MCNC: NEGATIVE — SIGNIFICANT CHANGE UP
PH UR: 7.5 — SIGNIFICANT CHANGE UP (ref 5–8)
PLATELET # BLD AUTO: 197 K/UL — SIGNIFICANT CHANGE UP (ref 150–400)
POTASSIUM SERPL-MCNC: 4.1 MMOL/L — SIGNIFICANT CHANGE UP (ref 3.5–5.3)
POTASSIUM SERPL-SCNC: 4.1 MMOL/L — SIGNIFICANT CHANGE UP (ref 3.5–5.3)
PROT SERPL-MCNC: 7.5 G/DL — SIGNIFICANT CHANGE UP (ref 6–8.3)
PROT UR-MCNC: NEGATIVE MG/DL — SIGNIFICANT CHANGE UP
RBC # BLD: 5.1 M/UL — SIGNIFICANT CHANGE UP (ref 4.2–5.8)
RBC # FLD: 13 % — SIGNIFICANT CHANGE UP (ref 10.3–16.9)
SODIUM SERPL-SCNC: 143 MMOL/L — SIGNIFICANT CHANGE UP (ref 135–145)
SP GR SPEC: 1.01 — SIGNIFICANT CHANGE UP (ref 1–1.03)
UROBILINOGEN FLD QL: 0.2 E.U./DL — SIGNIFICANT CHANGE UP
WBC # BLD: 6.8 K/UL — SIGNIFICANT CHANGE UP (ref 3.8–10.5)
WBC # FLD AUTO: 6.8 K/UL — SIGNIFICANT CHANGE UP (ref 3.8–10.5)

## 2018-09-06 PROCEDURE — 99284 EMERGENCY DEPT VISIT MOD MDM: CPT

## 2018-09-06 PROCEDURE — 80053 COMPREHEN METABOLIC PANEL: CPT

## 2018-09-06 PROCEDURE — 81003 URINALYSIS AUTO W/O SCOPE: CPT

## 2018-09-06 PROCEDURE — 83690 ASSAY OF LIPASE: CPT

## 2018-09-06 PROCEDURE — 99283 EMERGENCY DEPT VISIT LOW MDM: CPT

## 2018-09-06 PROCEDURE — 85025 COMPLETE CBC W/AUTO DIFF WBC: CPT

## 2018-09-06 RX ORDER — SODIUM CHLORIDE 9 MG/ML
1000 INJECTION INTRAMUSCULAR; INTRAVENOUS; SUBCUTANEOUS ONCE
Qty: 0 | Refills: 0 | Status: COMPLETED | OUTPATIENT
Start: 2018-09-06 | End: 2018-09-06

## 2018-09-06 RX ADMIN — SODIUM CHLORIDE 2000 MILLILITER(S): 9 INJECTION INTRAMUSCULAR; INTRAVENOUS; SUBCUTANEOUS at 13:45

## 2018-09-06 NOTE — ED PROVIDER NOTE - ATTENDING CONTRIBUTION TO CARE
I have seen the pt and reviewed all pertinent clinical data. I agree with the documentation/care/plan executed by SUSANA Capellan.

## 2018-09-06 NOTE — ED PROVIDER NOTE - OBJECTIVE STATEMENT
42 yo male with h/o anxiety, in the ER c/o " not feeling well" . Pt reports he had diarrhea in July, was diagnosed with E.coli infection in his stool and since that he is" not feeling well". pt underwent extensive work up, including few ER visits, blood tests, CT scan, later colonoscopy and endoscopy. Pt was advised to f/u with ID doctor and has an appointment next month. Pt appears anxious. denies fever, chills, nausea, vomiting, changes in appetite, blood in the stool.

## 2018-09-06 NOTE — ED ADULT NURSE NOTE - NSIMPLEMENTINTERV_GEN_ALL_ED
Implemented All Universal Safety Interventions:  [place] to call system. Call bell, personal items and telephone within reach. Instruct patient to call for assistance. Room bathroom lighting operational. Non-slip footwear when patient is off stretcher. Physically safe environment: no spills, clutter or unnecessary equipment. Stretcher in lowest position, wheels locked, appropriate side rails in place.

## 2018-09-06 NOTE — ED ADULT NURSE NOTE - OBJECTIVE STATEMENT
Pt presents to ED c/o diarrhea. Pt with Hx E coli in july, reports diarrhea consistently since then. Pt has been seen by his GI with endoscopy and colonoscopy, was told he needs to f/u with tropical disease MD but can't get appt until october. Pt reports he had "different looking stool" this morning which prompted him to come to ED. Pt denies fevers, chills, abdominal pain, N/V, gu symptoms. Pt presents in NAD speaking full sentences ambulatory through triage.

## 2018-09-06 NOTE — ED PROVIDER NOTE - MEDICAL DECISION MAKING DETAILS
44 yo male with h/o anxiety, acid reflux, in the ER concerned about his stool appearance and abdominal pain that has been persistent since July, 18. Pt was treated for diarrhea , caused by E.coli. VSS, labs- no acute findings, exam- unremarkable. Pt had abd. Ct scan, colonoscopy and endoscopy done within past 2 month and was recommended to f/u with ID. anticipate d/c home for out pt f/u with ID . will involve ER referral coordinator to expedite his appointment.

## 2018-09-06 NOTE — ED ADULT TRIAGE NOTE - CHIEF COMPLAINT QUOTE
pt c/o intermittent abdominal pain. Pt states " I've been having problems with my bowels since July when I was treated for E.coli"

## 2019-04-03 NOTE — ED ADULT TRIAGE NOTE - ACCOMPANIED BY
[FreeTextEntry1] : The patient has a history of colonic polyps and diverticulosis. He reports that he was found to have elevated liver tests and subsequently had an MRI on February 17, 2019 which showed moderate to severe hepatic steatosis along with a subcentimeter indeterminate focus in the right hepatic lobe for which six-month followup was recommended. The patient complains of fatigue but denies abdominal pain, heartburn, dysphagia. Bowel movements are normal without melena or prior blood per rectum. The patient has gained 8 pounds over the past 1-1/2 years.\par \par \par Note from 8/1/17 - The patient is status post colonoscopy performed in July 13, 2017. Examination was significant for removal of a tubular adenoma, along with mild diverticulosis and internal hemorrhoids which are asymptomatic. The patient has 2 bowel movements a day which are usually solid. He does note urgency with loose stools approximately twice a week. He denies melena or bright or blood per rectum. He denies heartburn or dysphagia. His weight is stable. Review of systems is negative.\par \par Note from 6/12/17 - Patient has a history of colonic polyps. He had multiple polyps removed on his previous colonoscopy. He feels well denying abdominal pain or chest pain. His stools are occasionally loose. He denies melena or bright red blood per rectum. Patient does not have heartburn or dysphagia. His weight is stable.\par \par The patient has a 4.7 cm thoracic aneurysm which is followed by cardiology and cardiothoracic surgery. The patient has not been hospitalized in the past year and denies any other cardiac issues.
Self

## 2019-11-03 ENCOUNTER — EMERGENCY (EMERGENCY)
Facility: HOSPITAL | Age: 44
LOS: 1 days | Discharge: HOME | End: 2019-11-03
Attending: EMERGENCY MEDICINE | Admitting: EMERGENCY MEDICINE
Payer: COMMERCIAL

## 2019-11-03 VITALS
DIASTOLIC BLOOD PRESSURE: 93 MMHG | OXYGEN SATURATION: 99 % | RESPIRATION RATE: 16 BRPM | SYSTOLIC BLOOD PRESSURE: 142 MMHG | TEMPERATURE: 98 F | HEART RATE: 93 BPM

## 2019-11-03 VITALS
DIASTOLIC BLOOD PRESSURE: 101 MMHG | HEART RATE: 114 BPM | WEIGHT: 270.07 LBS | SYSTOLIC BLOOD PRESSURE: 140 MMHG | OXYGEN SATURATION: 99 % | RESPIRATION RATE: 18 BRPM | TEMPERATURE: 98 F

## 2019-11-03 DIAGNOSIS — K52.9 NONINFECTIVE GASTROENTERITIS AND COLITIS, UNSPECIFIED: ICD-10-CM

## 2019-11-03 DIAGNOSIS — R10.32 LEFT LOWER QUADRANT PAIN: ICD-10-CM

## 2019-11-03 LAB
ALBUMIN SERPL ELPH-MCNC: 4.6 G/DL — SIGNIFICANT CHANGE UP (ref 3.3–5)
ALP SERPL-CCNC: 71 U/L — SIGNIFICANT CHANGE UP (ref 40–120)
ALT FLD-CCNC: 23 U/L — SIGNIFICANT CHANGE UP (ref 10–45)
ANION GAP SERPL CALC-SCNC: 12 MMOL/L — SIGNIFICANT CHANGE UP (ref 5–17)
APPEARANCE UR: CLEAR — SIGNIFICANT CHANGE UP
APTT BLD: 29.5 SEC — SIGNIFICANT CHANGE UP (ref 27.5–36.3)
AST SERPL-CCNC: 16 U/L — SIGNIFICANT CHANGE UP (ref 10–40)
BILIRUB SERPL-MCNC: 0.6 MG/DL — SIGNIFICANT CHANGE UP (ref 0.2–1.2)
BILIRUB UR-MCNC: NEGATIVE — SIGNIFICANT CHANGE UP
BUN SERPL-MCNC: 12 MG/DL — SIGNIFICANT CHANGE UP (ref 7–23)
CALCIUM SERPL-MCNC: 9.3 MG/DL — SIGNIFICANT CHANGE UP (ref 8.4–10.5)
CHLORIDE SERPL-SCNC: 100 MMOL/L — SIGNIFICANT CHANGE UP (ref 96–108)
CO2 SERPL-SCNC: 27 MMOL/L — SIGNIFICANT CHANGE UP (ref 22–31)
COLOR SPEC: YELLOW — SIGNIFICANT CHANGE UP
CREAT SERPL-MCNC: 1.07 MG/DL — SIGNIFICANT CHANGE UP (ref 0.5–1.3)
DIFF PNL FLD: NEGATIVE — SIGNIFICANT CHANGE UP
GLUCOSE SERPL-MCNC: 108 MG/DL — HIGH (ref 70–99)
GLUCOSE UR QL: NEGATIVE — SIGNIFICANT CHANGE UP
HCT VFR BLD CALC: 46.5 % — SIGNIFICANT CHANGE UP (ref 39–50)
HGB BLD-MCNC: 15.5 G/DL — SIGNIFICANT CHANGE UP (ref 13–17)
INR BLD: 1.05 — SIGNIFICANT CHANGE UP (ref 0.88–1.16)
KETONES UR-MCNC: NEGATIVE — SIGNIFICANT CHANGE UP
LACTATE SERPL-SCNC: 1.3 MMOL/L — SIGNIFICANT CHANGE UP (ref 0.5–2)
LEUKOCYTE ESTERASE UR-ACNC: NEGATIVE — SIGNIFICANT CHANGE UP
LIDOCAIN IGE QN: 28 U/L — SIGNIFICANT CHANGE UP (ref 7–60)
MCHC RBC-ENTMCNC: 29.2 PG — SIGNIFICANT CHANGE UP (ref 27–34)
MCHC RBC-ENTMCNC: 33.3 GM/DL — SIGNIFICANT CHANGE UP (ref 32–36)
MCV RBC AUTO: 87.6 FL — SIGNIFICANT CHANGE UP (ref 80–100)
NITRITE UR-MCNC: NEGATIVE — SIGNIFICANT CHANGE UP
NRBC # BLD: 0 /100 WBCS — SIGNIFICANT CHANGE UP (ref 0–0)
PH UR: 7 — SIGNIFICANT CHANGE UP (ref 5–8)
PLATELET # BLD AUTO: 191 K/UL — SIGNIFICANT CHANGE UP (ref 150–400)
POTASSIUM SERPL-MCNC: 4 MMOL/L — SIGNIFICANT CHANGE UP (ref 3.5–5.3)
POTASSIUM SERPL-SCNC: 4 MMOL/L — SIGNIFICANT CHANGE UP (ref 3.5–5.3)
PROT SERPL-MCNC: 7.5 G/DL — SIGNIFICANT CHANGE UP (ref 6–8.3)
PROT UR-MCNC: NEGATIVE MG/DL — SIGNIFICANT CHANGE UP
PROTHROM AB SERPL-ACNC: 11.9 SEC — SIGNIFICANT CHANGE UP (ref 10–12.9)
RBC # BLD: 5.31 M/UL — SIGNIFICANT CHANGE UP (ref 4.2–5.8)
RBC # FLD: 12.9 % — SIGNIFICANT CHANGE UP (ref 10.3–14.5)
SODIUM SERPL-SCNC: 139 MMOL/L — SIGNIFICANT CHANGE UP (ref 135–145)
SP GR SPEC: 1.01 — SIGNIFICANT CHANGE UP (ref 1–1.03)
UROBILINOGEN FLD QL: 0.2 E.U./DL — SIGNIFICANT CHANGE UP
WBC # BLD: 6.2 K/UL — SIGNIFICANT CHANGE UP (ref 3.8–10.5)
WBC # FLD AUTO: 6.2 K/UL — SIGNIFICANT CHANGE UP (ref 3.8–10.5)

## 2019-11-03 PROCEDURE — 96374 THER/PROPH/DIAG INJ IV PUSH: CPT

## 2019-11-03 PROCEDURE — 83605 ASSAY OF LACTIC ACID: CPT

## 2019-11-03 PROCEDURE — 80053 COMPREHEN METABOLIC PANEL: CPT

## 2019-11-03 PROCEDURE — 81003 URINALYSIS AUTO W/O SCOPE: CPT

## 2019-11-03 PROCEDURE — 83690 ASSAY OF LIPASE: CPT

## 2019-11-03 PROCEDURE — 99284 EMERGENCY DEPT VISIT MOD MDM: CPT

## 2019-11-03 PROCEDURE — 85610 PROTHROMBIN TIME: CPT

## 2019-11-03 PROCEDURE — 85730 THROMBOPLASTIN TIME PARTIAL: CPT

## 2019-11-03 PROCEDURE — 36415 COLL VENOUS BLD VENIPUNCTURE: CPT

## 2019-11-03 PROCEDURE — 85027 COMPLETE CBC AUTOMATED: CPT

## 2019-11-03 PROCEDURE — 96361 HYDRATE IV INFUSION ADD-ON: CPT

## 2019-11-03 PROCEDURE — 99284 EMERGENCY DEPT VISIT MOD MDM: CPT | Mod: 25

## 2019-11-03 RX ORDER — SODIUM CHLORIDE 9 MG/ML
1000 INJECTION INTRAMUSCULAR; INTRAVENOUS; SUBCUTANEOUS ONCE
Refills: 0 | Status: COMPLETED | OUTPATIENT
Start: 2019-11-03 | End: 2019-11-03

## 2019-11-03 RX ORDER — METOCLOPRAMIDE HCL 10 MG
10 TABLET ORAL ONCE
Refills: 0 | Status: COMPLETED | OUTPATIENT
Start: 2019-11-03 | End: 2019-11-03

## 2019-11-03 RX ADMIN — SODIUM CHLORIDE 1000 MILLILITER(S): 9 INJECTION INTRAMUSCULAR; INTRAVENOUS; SUBCUTANEOUS at 12:30

## 2019-11-03 RX ADMIN — SODIUM CHLORIDE 1000 MILLILITER(S): 9 INJECTION INTRAMUSCULAR; INTRAVENOUS; SUBCUTANEOUS at 13:15

## 2019-11-03 RX ADMIN — SODIUM CHLORIDE 1000 MILLILITER(S): 9 INJECTION INTRAMUSCULAR; INTRAVENOUS; SUBCUTANEOUS at 13:01

## 2019-11-03 RX ADMIN — Medication 10 MILLIGRAM(S): at 12:08

## 2019-11-03 RX ADMIN — SODIUM CHLORIDE 1000 MILLILITER(S): 9 INJECTION INTRAMUSCULAR; INTRAVENOUS; SUBCUTANEOUS at 12:08

## 2019-11-03 NOTE — ED PROVIDER NOTE - NSFOLLOWUPINSTRUCTIONS_ED_ALL_ED_FT
Immediately return to the Emergency Department or call 911 for any high fever, trouble breathing, severe vomiting, worsening pain, or any other concerns.    Diarrhea    Diarrhea is frequent loose or watery bowel movements that has many causes. Diarrhea can make you feel weak and cause you to become dehydrated. Diarrhea typically lasts 2–3 days, but can last longer if it is a sign of something more serious. Drink clear fluids to prevent dehydration. Eat bland, easy-to-digest foods as tolerated.     SEEK IMMEDIATE MEDICAL CARE IF YOU HAVE ANY OF THE FOLLOWING SYMPTOMS: high fevers, lightheadedness/dizziness, chest pain, black or bloody stools, shortness of breath, severe abdominal or back pain, or any signs of dehydration.

## 2019-11-03 NOTE — ED PROVIDER NOTE - OBJECTIVE STATEMENT
44M pmh anxiety, depression, IBS, no prior surgeries, p/w f/c 4d ago (resolved), 3d of diarrhea w/ cramping, 1d LLQ abd cramping. Diarrhea is watery, occasional mucous. No recent travel. No f/c in past 3 days. Tolerating PO well, hydrating.  No cp, no sob, no black/bloody stool, no n/v.

## 2019-11-03 NOTE — ED PROVIDER NOTE - PATIENT PORTAL LINK FT
You can access the FollowMyHealth Patient Portal offered by [facility] by registering at the following website: [email or web address]. By joining [employer]’s FollowMyHealth portal, you will also be able to view your health information using other applications (apps) compatible with our system.

## 2019-11-03 NOTE — ED PROVIDER NOTE - PHYSICAL EXAMINATION
VITAL SIGNS: I have reviewed nursing notes and confirm.  CONSTITUTIONAL: Well-developed; well-nourished; in no acute distress.  SKIN: Skin is warm and dry, no acute rash.  HEAD: Normocephalic; atraumatic.  EYES:  EOM intact; conjunctiva and sclera clear.  ENT: No nasal discharge; airway clear.  NECK: Supple; Voluntary FROM  CARD: No rubs appreciated, tachycardic rate and rhythm.  RESP: No wheezes, no rales. No respiratory distress  ABD: Soft; non-distended; non-tender to deep palpation; no rebound or guarding  EXT: Normal ROM. No cyanosis or edema.  NEURO: Alert, oriented. Grossly unremarkable.  PSYCH: Cooperative, appropriate.

## 2019-11-03 NOTE — ED ADULT NURSE NOTE - OBJECTIVE STATEMENT
Patient presents to the ED coming of chills on Wed and thursday and on Friday started having periumbilical abdominal pain. + nausea and diarrhea. No blood in stool. Hx of IBS, anxiety and depression. No recent travel or sick contacts.

## 2019-11-03 NOTE — ED PROVIDER NOTE - CLINICAL SUMMARY MEDICAL DECISION MAKING FREE TEXT BOX
44M pmh anxiety, depression, IBS, no prior surgeries, p/w f/c 4d ago (resolved), 3d of diarrhea w/ cramping, 1d LLQ abd cramping. Diarrhea is watery, occasional mucous. No recent travel. No f/c in past 3 days. Tolerating PO well, hydrating.  No cp, no sob, no black/bloody stool, no n/v. Exam w/o acute finding, no tenderness including to deep palpation. Concern gastritis, gastroenteritis, unlikely acute diverticulitis, not consistent w/ sepsis. discussed return precautions. Feeling much improved, no acute life threatening illness. Pt seeking discharge.

## 2019-11-04 ENCOUNTER — EMERGENCY (EMERGENCY)
Facility: HOSPITAL | Age: 44
LOS: 1 days | Discharge: HOME | End: 2019-11-04
Attending: EMERGENCY MEDICINE | Admitting: EMERGENCY MEDICINE
Payer: COMMERCIAL

## 2019-11-04 VITALS
OXYGEN SATURATION: 98 % | RESPIRATION RATE: 18 BRPM | SYSTOLIC BLOOD PRESSURE: 138 MMHG | TEMPERATURE: 99 F | HEART RATE: 88 BPM | DIASTOLIC BLOOD PRESSURE: 96 MMHG

## 2019-11-04 VITALS
TEMPERATURE: 98 F | HEIGHT: 76 IN | OXYGEN SATURATION: 97 % | RESPIRATION RATE: 17 BRPM | DIASTOLIC BLOOD PRESSURE: 95 MMHG | SYSTOLIC BLOOD PRESSURE: 148 MMHG | HEART RATE: 112 BPM | WEIGHT: 270.07 LBS

## 2019-11-04 LAB
ALBUMIN SERPL ELPH-MCNC: 4.6 G/DL — SIGNIFICANT CHANGE UP (ref 3.3–5)
ALP SERPL-CCNC: 68 U/L — SIGNIFICANT CHANGE UP (ref 40–120)
ALT FLD-CCNC: 23 U/L — SIGNIFICANT CHANGE UP (ref 10–45)
ANION GAP SERPL CALC-SCNC: 10 MMOL/L — SIGNIFICANT CHANGE UP (ref 5–17)
APPEARANCE UR: CLEAR — SIGNIFICANT CHANGE UP
APTT BLD: 29 SEC — SIGNIFICANT CHANGE UP (ref 27.5–36.3)
AST SERPL-CCNC: 16 U/L — SIGNIFICANT CHANGE UP (ref 10–40)
BASOPHILS # BLD AUTO: 0.04 K/UL — SIGNIFICANT CHANGE UP (ref 0–0.2)
BASOPHILS NFR BLD AUTO: 0.6 % — SIGNIFICANT CHANGE UP (ref 0–2)
BILIRUB SERPL-MCNC: 0.4 MG/DL — SIGNIFICANT CHANGE UP (ref 0.2–1.2)
BILIRUB UR-MCNC: NEGATIVE — SIGNIFICANT CHANGE UP
BUN SERPL-MCNC: 11 MG/DL — SIGNIFICANT CHANGE UP (ref 7–23)
CALCIUM SERPL-MCNC: 8.9 MG/DL — SIGNIFICANT CHANGE UP (ref 8.4–10.5)
CHLORIDE SERPL-SCNC: 104 MMOL/L — SIGNIFICANT CHANGE UP (ref 96–108)
CO2 SERPL-SCNC: 26 MMOL/L — SIGNIFICANT CHANGE UP (ref 22–31)
COLOR SPEC: YELLOW — SIGNIFICANT CHANGE UP
CREAT SERPL-MCNC: 0.97 MG/DL — SIGNIFICANT CHANGE UP (ref 0.5–1.3)
DIFF PNL FLD: NEGATIVE — SIGNIFICANT CHANGE UP
EOSINOPHIL # BLD AUTO: 0.06 K/UL — SIGNIFICANT CHANGE UP (ref 0–0.5)
EOSINOPHIL NFR BLD AUTO: 0.9 % — SIGNIFICANT CHANGE UP (ref 0–6)
GLUCOSE SERPL-MCNC: 106 MG/DL — HIGH (ref 70–99)
GLUCOSE UR QL: NEGATIVE — SIGNIFICANT CHANGE UP
HCT VFR BLD CALC: 43.6 % — SIGNIFICANT CHANGE UP (ref 39–50)
HGB BLD-MCNC: 14.7 G/DL — SIGNIFICANT CHANGE UP (ref 13–17)
IMM GRANULOCYTES NFR BLD AUTO: 0.3 % — SIGNIFICANT CHANGE UP (ref 0–1.5)
INR BLD: 1.01 — SIGNIFICANT CHANGE UP (ref 0.88–1.16)
KETONES UR-MCNC: NEGATIVE — SIGNIFICANT CHANGE UP
LEUKOCYTE ESTERASE UR-ACNC: NEGATIVE — SIGNIFICANT CHANGE UP
LIDOCAIN IGE QN: 51 U/L — SIGNIFICANT CHANGE UP (ref 7–60)
LYMPHOCYTES # BLD AUTO: 1.43 K/UL — SIGNIFICANT CHANGE UP (ref 1–3.3)
LYMPHOCYTES # BLD AUTO: 22.1 % — SIGNIFICANT CHANGE UP (ref 13–44)
MCHC RBC-ENTMCNC: 29.6 PG — SIGNIFICANT CHANGE UP (ref 27–34)
MCHC RBC-ENTMCNC: 33.7 GM/DL — SIGNIFICANT CHANGE UP (ref 32–36)
MCV RBC AUTO: 87.7 FL — SIGNIFICANT CHANGE UP (ref 80–100)
MONOCYTES # BLD AUTO: 0.55 K/UL — SIGNIFICANT CHANGE UP (ref 0–0.9)
MONOCYTES NFR BLD AUTO: 8.5 % — SIGNIFICANT CHANGE UP (ref 2–14)
NEUTROPHILS # BLD AUTO: 4.38 K/UL — SIGNIFICANT CHANGE UP (ref 1.8–7.4)
NEUTROPHILS NFR BLD AUTO: 67.6 % — SIGNIFICANT CHANGE UP (ref 43–77)
NITRITE UR-MCNC: NEGATIVE — SIGNIFICANT CHANGE UP
NRBC # BLD: 0 /100 WBCS — SIGNIFICANT CHANGE UP (ref 0–0)
PH UR: 6 — SIGNIFICANT CHANGE UP (ref 5–8)
PLATELET # BLD AUTO: 195 K/UL — SIGNIFICANT CHANGE UP (ref 150–400)
POTASSIUM SERPL-MCNC: 4.2 MMOL/L — SIGNIFICANT CHANGE UP (ref 3.5–5.3)
POTASSIUM SERPL-SCNC: 4.2 MMOL/L — SIGNIFICANT CHANGE UP (ref 3.5–5.3)
PROT SERPL-MCNC: 7.2 G/DL — SIGNIFICANT CHANGE UP (ref 6–8.3)
PROT UR-MCNC: NEGATIVE MG/DL — SIGNIFICANT CHANGE UP
PROTHROM AB SERPL-ACNC: 11.4 SEC — SIGNIFICANT CHANGE UP (ref 10–12.9)
RBC # BLD: 4.97 M/UL — SIGNIFICANT CHANGE UP (ref 4.2–5.8)
RBC # FLD: 12.9 % — SIGNIFICANT CHANGE UP (ref 10.3–14.5)
SODIUM SERPL-SCNC: 140 MMOL/L — SIGNIFICANT CHANGE UP (ref 135–145)
SP GR SPEC: 1.02 — SIGNIFICANT CHANGE UP (ref 1–1.03)
UROBILINOGEN FLD QL: 0.2 E.U./DL — SIGNIFICANT CHANGE UP
WBC # BLD: 6.48 K/UL — SIGNIFICANT CHANGE UP (ref 3.8–10.5)
WBC # FLD AUTO: 6.48 K/UL — SIGNIFICANT CHANGE UP (ref 3.8–10.5)

## 2019-11-04 PROCEDURE — 74177 CT ABD & PELVIS W/CONTRAST: CPT | Mod: 26

## 2019-11-04 PROCEDURE — 85025 COMPLETE CBC W/AUTO DIFF WBC: CPT

## 2019-11-04 PROCEDURE — 99284 EMERGENCY DEPT VISIT MOD MDM: CPT

## 2019-11-04 PROCEDURE — 81003 URINALYSIS AUTO W/O SCOPE: CPT

## 2019-11-04 PROCEDURE — 80053 COMPREHEN METABOLIC PANEL: CPT

## 2019-11-04 PROCEDURE — 36415 COLL VENOUS BLD VENIPUNCTURE: CPT

## 2019-11-04 PROCEDURE — 85610 PROTHROMBIN TIME: CPT

## 2019-11-04 PROCEDURE — 99284 EMERGENCY DEPT VISIT MOD MDM: CPT | Mod: 25

## 2019-11-04 PROCEDURE — 74177 CT ABD & PELVIS W/CONTRAST: CPT

## 2019-11-04 PROCEDURE — 83690 ASSAY OF LIPASE: CPT

## 2019-11-04 PROCEDURE — 85730 THROMBOPLASTIN TIME PARTIAL: CPT

## 2019-11-04 RX ORDER — SODIUM CHLORIDE 9 MG/ML
1000 INJECTION INTRAMUSCULAR; INTRAVENOUS; SUBCUTANEOUS ONCE
Refills: 0 | Status: COMPLETED | OUTPATIENT
Start: 2019-11-04 | End: 2019-11-04

## 2019-11-04 RX ORDER — METRONIDAZOLE 500 MG
1 TABLET ORAL
Qty: 21 | Refills: 0
Start: 2019-11-04 | End: 2019-11-10

## 2019-11-04 RX ORDER — LAMOTRIGINE 25 MG/1
0 TABLET, ORALLY DISINTEGRATING ORAL
Qty: 0 | Refills: 0 | DISCHARGE

## 2019-11-04 RX ORDER — IOHEXOL 300 MG/ML
30 INJECTION, SOLUTION INTRAVENOUS ONCE
Refills: 0 | Status: COMPLETED | OUTPATIENT
Start: 2019-11-04 | End: 2019-11-04

## 2019-11-04 RX ADMIN — SODIUM CHLORIDE 1000 MILLILITER(S): 9 INJECTION INTRAMUSCULAR; INTRAVENOUS; SUBCUTANEOUS at 09:54

## 2019-11-04 RX ADMIN — IOHEXOL 30 MILLILITER(S): 300 INJECTION, SOLUTION INTRAVENOUS at 09:55

## 2019-11-04 NOTE — ED PROVIDER NOTE - OBJECTIVE STATEMENT
44 year old male with history of GERD, anxiety presents to ED with concern for ongoing abdominal pain over the past several days.  Patient notes he was seen in ED yesterday and ultimately discharged with instruction to return should his pain persist or worsen, at which point CT imaging of abd/pel would be recommended.  Patient notes associated diarrhea, which has been improving over the past several days.  He denies associated fever, chills, chest pain, shortness of breath, sore throat, ear pain, nausea, emesis, BRBPR, melanotic appearing stool, rashes, recent travel, sick contacts or any additional acute complaints or concerns at this time.

## 2019-11-04 NOTE — ED PROVIDER NOTE - PATIENT PORTAL LINK FT
You can access the FollowMyHealth Patient Portal offered by [facility] by registering at the following website: [email or web address]. By joining [employer]’s FollowMyHealth portal, you will also be able to view your health information using other applications (apps) compatible with our system.

## 2019-11-04 NOTE — ED PROVIDER NOTE - CLINICAL SUMMARY MEDICAL DECISION MAKING FREE TEXT BOX
Patient in ED w concern for ongoing abd discomfort and diarrhea - returning today for refractory symptoms, requesting imaging.  Patient notes history of IBS, stating this feels different to him.  Mild ttp over lower abd.  No peritoneal signs.  Labs and CT abd/pel completed in ED.  All results are discussed with patient at bedside.  Given refractory symptoms, and CT showing likely colitis - will treat with oral abx and advise prompt PCP follow up for re eval.  Patient is instructed to return to ED Immediately should his symptoms worsen despite use of abx and will otherwise follow up w his PCP in 1-2 days for re eval as discussed.  Patient is aware of plan and verbalizes his understanding.  Will discharge home at this time.

## 2019-11-04 NOTE — ED PROVIDER NOTE - GASTROINTESTINAL, MLM
Abdomen soft, non-tender, no guarding. Abdomen soft, mild ttp over lower abd without rebound, rigidity or guarding.

## 2019-11-04 NOTE — ED PROVIDER NOTE - NSFOLLOWUPINSTRUCTIONS_ED_ALL_ED_FT
Please follow up with your primary physician in 1-2 days for re evaluation.  Please return to ER immediately should your symptoms worsen or if you have any concern prior to this recommended follow up.    Colitis    WHAT YOU NEED TO KNOW:    Colitis is swelling and irritation of your colon. Colitis may be caused by ulcers or a problem with your immune system. Bacteria, a virus, or a parasite may also cause colitis. The cause may not be known. You may have diarrhea, abdominal pain, fever, or blood or mucus in your bowel movement.    DISCHARGE INSTRUCTIONS:    Return to the emergency department if:     You have sudden trouble breathing.       Your bowel movements are black or have blood in them.      You have blood in your vomit.      You have severe abdominal pain or your abdomen is swollen and feels hard.      You have any of the following signs of dehydration:   Dizziness or weakness      Dry mouth, cracked lips, or severe thirst      Fast heartbeat or breathing      Urinating very little or not at all    Contact your healthcare provider if:     Your symptoms get worse or do not go away.      You have a fever, chills, cough, or feel weak and achy.      You suddenly lose weight without trying.       You have questions or concerns about your condition or care.     Medicines:     Medicines may be given to decrease inflammation in your colon and treat diarrhea.      Take your medicine as directed. Contact your healthcare provider if you think your medicine is not helping or if you have side effects. Tell him of her if you are allergic to any medicine. Keep a list of the medicines, vitamins, and herbs you take. Include the amounts, and when and why you take them. Bring the list or the pill bottles to follow-up visits. Carry your medicine list with you in case of an emergency.    Manage your symptoms:     Drink liquids as directed to help prevent dehydration. Good liquids to drink include water, juice, and broth. Ask how much liquid to drink each day. You may need to drink an oral rehydration solution (ORS). An ORS contains a balance of water, salt, and sugar to replace body fluids lost during diarrhea.       Eat a variety of healthy foods. Healthy foods include fruits, vegetables, whole-grain breads, beans, low-fat dairy products, lean meats, and fish. You may need to eat several small meals throughout the day instead of large meals. Avoid spicy foods, caffeine, chocolate, and foods high in fat.      Talk to your healthcare provider before you take NSAIDs. NSAIDs can cause worsen your symptoms if ulcers are causing your colitis.       Start to exercise when you feel better. Regular exercise helps your bowels work normally. Ask about the best exercise plan for you.    Follow up with your healthcare provider as directed: You may need to return for a colonoscopy or other tests. Write down how often you have a bowel movements and what they look like. Bring this to your follow-up visits. Write down your questions so you remember to ask them during your visits.       © Copyright [employer] 2019 All illustrations and images included in CareNotes are the copyrighted property of [employer] or [employer].      back to top                      © Copyright [employer] 2019

## 2019-11-04 NOTE — ED ADULT TRIAGE NOTE - OTHER COMPLAINTS
pt c.o lower abd pain with diarrhea x few days. advised to return to ed for ct scan if pain persists.

## 2019-11-09 DIAGNOSIS — R10.9 UNSPECIFIED ABDOMINAL PAIN: ICD-10-CM

## 2019-11-09 DIAGNOSIS — K52.9 NONINFECTIVE GASTROENTERITIS AND COLITIS, UNSPECIFIED: ICD-10-CM

## 2020-01-23 NOTE — ED ADULT TRIAGE NOTE - SOURCE OF INFORMATION
Patient here with left calf pain, states she had a doppler done at her PCP last Thurs, negative for clot, redness, and pain noted.   
Patient

## 2020-02-13 NOTE — ED PROVIDER NOTE - HISTORY ATTESTATION, MLM
On 2/13/2020, 8 pages of records were received from Ruben Fuentes. This writer put a copy of the records in Patsy Dominique's folder upfront and this was routed to Patsy, please shred your copy when finished.  I sent the original documents to scanning on 2/13/2020 and held a copy in Psychiatry until scanning is confirmed. Katiuska Lubin, CMA          
I have reviewed and confirmed nurses' notes...

## 2020-08-22 NOTE — ED ADULT NURSE NOTE - CAS TRG GEN SKIN CONDITION
08/22/20 0944   Vital Signs   Temp 99.4 °F (37.4 °C)   Temp Source Axillary   Pulse 94   Heart Rate Source Monitor   Resp 18   /64   BP Location Left lower arm   BP Upper/Lower Lower   Level of Consciousness 0   MEWS Score 1   Oxygen Therapy   SpO2 91 %   O2 Device High flow nasal cannula   O2 Flow Rate (L/min) 6 L/min       Patient arouses by light stimulation and is combative and refusing morning medications. Patient repeatedly states that she cannot breathe. Patient is anxious and confused, stating she does not know why she is here. Unable to re-orient patient. Remained with patient until calm and respirations back to baseline. Patient requesting ginger ale to drink, provided nectar thick cup of ginger ale. Call light in reach. Bed alarm on. Warm

## 2020-10-22 NOTE — ED PROVIDER NOTE - NS_EDPROVIDERDISPOUSERTYPE_ED_A_ED
Bedside report given to ICU nurse.      Jen Tinsley, [facility]  10/22/20 2051
Iv attempt x several times without success per several staff.      Mohan Ford RN  10/21/20 1857
Pt pulled up in bed and repositioned.      Lorraine Tinajero, RN  10/22/20 5420
Pt sleeping, resp easy and unlabored     Lorraine Tinajero RN  10/22/20 8971
Pure wick plaqced prior to giving Lasix as per MD order     Naren Godoy, [facility]  10/22/20 4664
Attending Attestation (For Attendings USE Only)...

## 2021-02-18 NOTE — BEHAVIORAL HEALTH ASSESSMENT NOTE - NSBHADMITCOORDWITH_PSY_A_CORE
Primary Defect Length In Cm (Final Defect Size - Required For Flaps/Grafts): 2.2 outpatient provider/social work/family/Caregiver/nursing, [employer]

## 2021-10-26 NOTE — ED ADULT TRIAGE NOTE - PRO INTERPRETER NEED 2
Billing Type: Third-Party Bill Performing Laboratory: -102 Bill For Surgical Tray: no English Expected Date Of Service: 10/26/2021

## 2022-05-25 NOTE — ED ADULT NURSE NOTE - PMH
Acid reflux    Anxiety No. KEN screening performed.  STOP BANG Legend: 0-2 = LOW Risk; 3-4 = INTERMEDIATE Risk; 5-8 = HIGH Risk

## 2022-07-17 ENCOUNTER — EMERGENCY (EMERGENCY)
Facility: HOSPITAL | Age: 47
LOS: 1 days | Discharge: HOME | End: 2022-07-17
Attending: EMERGENCY MEDICINE | Admitting: EMERGENCY MEDICINE
Payer: COMMERCIAL

## 2022-07-17 VITALS
RESPIRATION RATE: 16 BRPM | WEIGHT: 265 LBS | SYSTOLIC BLOOD PRESSURE: 121 MMHG | TEMPERATURE: 98 F | HEART RATE: 110 BPM | OXYGEN SATURATION: 100 % | HEIGHT: 76 IN | DIASTOLIC BLOOD PRESSURE: 98 MMHG

## 2022-07-17 VITALS
OXYGEN SATURATION: 97 % | DIASTOLIC BLOOD PRESSURE: 82 MMHG | TEMPERATURE: 98 F | RESPIRATION RATE: 17 BRPM | SYSTOLIC BLOOD PRESSURE: 123 MMHG | HEART RATE: 98 BPM

## 2022-07-17 DIAGNOSIS — Z88.1 ALLERGY STATUS TO OTHER ANTIBIOTIC AGENTS STATUS: ICD-10-CM

## 2022-07-17 DIAGNOSIS — Z20.822 CONTACT WITH AND (SUSPECTED) EXPOSURE TO COVID-19: ICD-10-CM

## 2022-07-17 DIAGNOSIS — E78.5 HYPERLIPIDEMIA, UNSPECIFIED: ICD-10-CM

## 2022-07-17 DIAGNOSIS — K21.9 GASTRO-ESOPHAGEAL REFLUX DISEASE WITHOUT ESOPHAGITIS: ICD-10-CM

## 2022-07-17 DIAGNOSIS — R42 DIZZINESS AND GIDDINESS: ICD-10-CM

## 2022-07-17 DIAGNOSIS — M54.2 CERVICALGIA: ICD-10-CM

## 2022-07-17 DIAGNOSIS — G89.29 OTHER CHRONIC PAIN: ICD-10-CM

## 2022-07-17 DIAGNOSIS — I10 ESSENTIAL (PRIMARY) HYPERTENSION: ICD-10-CM

## 2022-07-17 DIAGNOSIS — R11.0 NAUSEA: ICD-10-CM

## 2022-07-17 DIAGNOSIS — F41.9 ANXIETY DISORDER, UNSPECIFIED: ICD-10-CM

## 2022-07-17 DIAGNOSIS — Z88.0 ALLERGY STATUS TO PENICILLIN: ICD-10-CM

## 2022-07-17 DIAGNOSIS — H93.19 TINNITUS, UNSPECIFIED EAR: ICD-10-CM

## 2022-07-17 LAB
ALBUMIN SERPL ELPH-MCNC: 5.1 G/DL — HIGH (ref 3.3–5)
ALP SERPL-CCNC: 55 U/L — SIGNIFICANT CHANGE UP (ref 40–120)
ALT FLD-CCNC: 28 U/L — SIGNIFICANT CHANGE UP (ref 10–45)
ANION GAP SERPL CALC-SCNC: 12 MMOL/L — SIGNIFICANT CHANGE UP (ref 5–17)
AST SERPL-CCNC: 20 U/L — SIGNIFICANT CHANGE UP (ref 10–40)
BASOPHILS # BLD AUTO: 0.03 K/UL — SIGNIFICANT CHANGE UP (ref 0–0.2)
BASOPHILS NFR BLD AUTO: 0.4 % — SIGNIFICANT CHANGE UP (ref 0–2)
BILIRUB SERPL-MCNC: 0.6 MG/DL — SIGNIFICANT CHANGE UP (ref 0.2–1.2)
BUN SERPL-MCNC: 16 MG/DL — SIGNIFICANT CHANGE UP (ref 7–23)
CALCIUM SERPL-MCNC: 9.8 MG/DL — SIGNIFICANT CHANGE UP (ref 8.4–10.5)
CHLORIDE SERPL-SCNC: 100 MMOL/L — SIGNIFICANT CHANGE UP (ref 96–108)
CO2 SERPL-SCNC: 26 MMOL/L — SIGNIFICANT CHANGE UP (ref 22–31)
CREAT SERPL-MCNC: 1.02 MG/DL — SIGNIFICANT CHANGE UP (ref 0.5–1.3)
EGFR: 92 ML/MIN/1.73M2 — SIGNIFICANT CHANGE UP
EOSINOPHIL # BLD AUTO: 0.09 K/UL — SIGNIFICANT CHANGE UP (ref 0–0.5)
EOSINOPHIL NFR BLD AUTO: 1.3 % — SIGNIFICANT CHANGE UP (ref 0–6)
GLUCOSE SERPL-MCNC: 135 MG/DL — HIGH (ref 70–99)
HCT VFR BLD CALC: 45.3 % — SIGNIFICANT CHANGE UP (ref 39–50)
HGB BLD-MCNC: 15.6 G/DL — SIGNIFICANT CHANGE UP (ref 13–17)
IMM GRANULOCYTES NFR BLD AUTO: 0.1 % — SIGNIFICANT CHANGE UP (ref 0–1.5)
LYMPHOCYTES # BLD AUTO: 2.17 K/UL — SIGNIFICANT CHANGE UP (ref 1–3.3)
LYMPHOCYTES # BLD AUTO: 32.2 % — SIGNIFICANT CHANGE UP (ref 13–44)
MCHC RBC-ENTMCNC: 29.5 PG — SIGNIFICANT CHANGE UP (ref 27–34)
MCHC RBC-ENTMCNC: 34.4 GM/DL — SIGNIFICANT CHANGE UP (ref 32–36)
MCV RBC AUTO: 85.6 FL — SIGNIFICANT CHANGE UP (ref 80–100)
MONOCYTES # BLD AUTO: 0.49 K/UL — SIGNIFICANT CHANGE UP (ref 0–0.9)
MONOCYTES NFR BLD AUTO: 7.3 % — SIGNIFICANT CHANGE UP (ref 2–14)
NEUTROPHILS # BLD AUTO: 3.95 K/UL — SIGNIFICANT CHANGE UP (ref 1.8–7.4)
NEUTROPHILS NFR BLD AUTO: 58.7 % — SIGNIFICANT CHANGE UP (ref 43–77)
NRBC # BLD: 0 /100 WBCS — SIGNIFICANT CHANGE UP (ref 0–0)
PLATELET # BLD AUTO: 234 K/UL — SIGNIFICANT CHANGE UP (ref 150–400)
POTASSIUM SERPL-MCNC: 4.7 MMOL/L — SIGNIFICANT CHANGE UP (ref 3.5–5.3)
POTASSIUM SERPL-SCNC: 4.7 MMOL/L — SIGNIFICANT CHANGE UP (ref 3.5–5.3)
PROT SERPL-MCNC: 7.6 G/DL — SIGNIFICANT CHANGE UP (ref 6–8.3)
RBC # BLD: 5.29 M/UL — SIGNIFICANT CHANGE UP (ref 4.2–5.8)
RBC # FLD: 12.7 % — SIGNIFICANT CHANGE UP (ref 10.3–14.5)
SARS-COV-2 RNA SPEC QL NAA+PROBE: NEGATIVE — SIGNIFICANT CHANGE UP
SODIUM SERPL-SCNC: 138 MMOL/L — SIGNIFICANT CHANGE UP (ref 135–145)
WBC # BLD: 6.74 K/UL — SIGNIFICANT CHANGE UP (ref 3.8–10.5)
WBC # FLD AUTO: 6.74 K/UL — SIGNIFICANT CHANGE UP (ref 3.8–10.5)

## 2022-07-17 PROCEDURE — 96374 THER/PROPH/DIAG INJ IV PUSH: CPT | Mod: XU

## 2022-07-17 PROCEDURE — 99285 EMERGENCY DEPT VISIT HI MDM: CPT

## 2022-07-17 PROCEDURE — 70496 CT ANGIOGRAPHY HEAD: CPT | Mod: MA

## 2022-07-17 PROCEDURE — 36415 COLL VENOUS BLD VENIPUNCTURE: CPT

## 2022-07-17 PROCEDURE — 93010 ELECTROCARDIOGRAM REPORT: CPT

## 2022-07-17 PROCEDURE — 87635 SARS-COV-2 COVID-19 AMP PRB: CPT

## 2022-07-17 PROCEDURE — 70498 CT ANGIOGRAPHY NECK: CPT | Mod: MA

## 2022-07-17 PROCEDURE — 70450 CT HEAD/BRAIN W/O DYE: CPT | Mod: MA

## 2022-07-17 PROCEDURE — 99285 EMERGENCY DEPT VISIT HI MDM: CPT | Mod: 25

## 2022-07-17 PROCEDURE — 93005 ELECTROCARDIOGRAM TRACING: CPT

## 2022-07-17 PROCEDURE — 80053 COMPREHEN METABOLIC PANEL: CPT

## 2022-07-17 PROCEDURE — 70496 CT ANGIOGRAPHY HEAD: CPT | Mod: 26,MA

## 2022-07-17 PROCEDURE — 70450 CT HEAD/BRAIN W/O DYE: CPT | Mod: 26,59,MA

## 2022-07-17 PROCEDURE — 70498 CT ANGIOGRAPHY NECK: CPT | Mod: 26,MA

## 2022-07-17 PROCEDURE — 85025 COMPLETE CBC W/AUTO DIFF WBC: CPT

## 2022-07-17 RX ORDER — METOCLOPRAMIDE HCL 10 MG
10 TABLET ORAL ONCE
Refills: 0 | Status: COMPLETED | OUTPATIENT
Start: 2022-07-17 | End: 2022-07-17

## 2022-07-17 RX ORDER — MECLIZINE HCL 12.5 MG
25 TABLET ORAL ONCE
Refills: 0 | Status: COMPLETED | OUTPATIENT
Start: 2022-07-17 | End: 2022-07-17

## 2022-07-17 RX ORDER — MECLIZINE HCL 12.5 MG
1 TABLET ORAL
Qty: 30 | Refills: 0
Start: 2022-07-17 | End: 2022-07-26

## 2022-07-17 RX ORDER — SODIUM CHLORIDE 9 MG/ML
1000 INJECTION INTRAMUSCULAR; INTRAVENOUS; SUBCUTANEOUS ONCE
Refills: 0 | Status: COMPLETED | OUTPATIENT
Start: 2022-07-17 | End: 2022-07-17

## 2022-07-17 RX ORDER — ONDANSETRON 8 MG/1
1 TABLET, FILM COATED ORAL
Qty: 15 | Refills: 0
Start: 2022-07-17 | End: 2022-07-21

## 2022-07-17 RX ADMIN — SODIUM CHLORIDE 1000 MILLILITER(S): 9 INJECTION INTRAMUSCULAR; INTRAVENOUS; SUBCUTANEOUS at 17:06

## 2022-07-17 RX ADMIN — Medication 25 MILLIGRAM(S): at 17:07

## 2022-07-17 RX ADMIN — Medication 104 MILLIGRAM(S): at 17:07

## 2022-07-17 NOTE — ED PROVIDER NOTE - NSFOLLOWUPINSTRUCTIONS_ED_ALL_ED_FT
Benign Positional Vertigo      Vertigo is the feeling that you or your surroundings are moving when they are not. Benign positional vertigo is the most common form of vertigo. This is usually a harmless condition (benign). This condition is positional. This means that symptoms are triggered by certain movements and positions.    This condition can be dangerous if it occurs while you are doing something that could cause harm to yourself or others. This includes activities such as driving or operating machinery.      What are the causes?    The inner ear has fluid-filled canals that help your brain sense movement and balance. When the fluid moves, the brain receives messages about your body's position.    With benign positional vertigo, calcium crystals in the inner ear break free and disturb the inner ear area. This causes your brain to receive confusing messages about your body's position.      What increases the risk?    You are more likely to develop this condition if:  •You are a woman.      •You are 50 years of age or older.      •You have recently had a head injury.      •You have an inner ear disease.        What are the signs or symptoms?    Symptoms of this condition usually happen when you move your head or your eyes in different directions. Symptoms may start suddenly and usually last for less than a minute. They include:  •Loss of balance and falling.      •Feeling like you are spinning or moving.      •Feeling like your surroundings are spinning or moving.      •Nausea and vomiting.      •Blurred vision.      •Dizziness.      •Involuntary eye movement (nystagmus).      Symptoms can be mild and cause only minor problems, or they can be severe and interfere with daily life. Episodes of benign positional vertigo may return (recur) over time. Symptoms may also improve over time.      How is this diagnosed?    This condition may be diagnosed based on:  •Your medical history.      •A physical exam of the head, neck, and ears.      •Positional tests to check for or stimulate vertigo. You may be asked to turn your head and change positions, such as going from sitting to lying down. A health care provider will watch for symptoms of vertigo.      You may be referred to a health care provider who specializes in ear, nose, and throat problems (ENT or otolaryngologist) or a provider who specializes in disorders of the nervous system (neurologist).      How is this treated?  Medical person standing behind sitting patient using both hands to move patient's head to another position.   This condition may be treated in a session in which your health care provider moves your head in specific positions to help the displaced crystals in your inner ear move. Treatment for this condition may take several sessions. Surgery may be needed in severe cases, but this is rare.    In some cases, benign positional vertigo may resolve on its own in 2–4 weeks.      Follow these instructions at home:    Safety     •Move slowly. Avoid sudden body or head movements or certain positions, as told by your health care provider.      •Avoid driving or operating machinery until your health care provider says it is safe.      •Avoid doing any tasks that would be dangerous to you or others if vertigo occurs.      •If you have trouble walking or keeping your balance, try using a cane for stability. If you feel dizzy or unstable, sit down right away.      •Return to your normal activities as told by your health care provider. Ask your health care provider what activities are safe for you.      General instructions     •Take over-the-counter and prescription medicines only as told by your health care provider.      •Drink enough fluid to keep your urine pale yellow.      •Keep all follow-up visits. This is important.        Contact a health care provider if:    •You have a fever.      •Your condition gets worse or you develop new symptoms.      •Your family or friends notice any behavioral changes.      •You have nausea or vomiting that gets worse.      •You have numbness or a prickling and tingling sensation.        Get help right away if you:    •Have difficulty speaking or moving.      •Are always dizzy or faint.      •Develop severe headaches.      •Have weakness in your legs or arms.      •Have changes in your hearing or vision.      •Develop a stiff neck.      •Develop sensitivity to light.      These symptoms may represent a serious problem that is an emergency. Do not wait to see if the symptoms will go away. Get medical help right away. Call your local emergency services (911 in the [place] Do not drive yourself to the hospital.       Summary    •Vertigo is the feeling that you or your surroundings are moving when they are not. Benign positional vertigo is the most common form of vertigo.      •This condition is caused by calcium crystals in the inner ear that become displaced. This causes a disturbance in an area of the inner ear that helps your brain sense movement and balance.      •Symptoms include loss of balance and falling, feeling that you or your surroundings are moving, nausea and vomiting, and blurred vision.      •This condition can be diagnosed based on symptoms, a physical exam, and positional tests.      •Follow safety instructions as told by your health care provider and keep all follow-up visits. This is important.      This information is not intended to replace advice given to you by your health care provider. Make sure you discuss any questions you have with your health care provider.

## 2022-07-17 NOTE — ED ADULT TRIAGE NOTE - CHIEF COMPLAINT QUOTE
x 3 days of dizziness, "like the room is spinning." ambulatory w/ steady gait. denies headaches, blurred vision, numbness, tingling, weakness.

## 2022-07-17 NOTE — ED PROVIDER NOTE - OBJECTIVE STATEMENT
47M with a h/o vertigo, HTN, HLD, who p/w chronic ear ringing and vertigo/dizziness since Friday, states it feels like unsteadiness and room spinning, worse with movement, did have some nausea which has now resolved. ALso c/o chronic left neck pain, no headache.  Usually vertigo sx go away but this time lasted longer prompting ER visit. Pt did not take anything for his sx. Denies n/t/w in ext, no cp/sob, no syncope, no f/c, no leg swelling, no other complaints.

## 2022-07-17 NOTE — ED ADULT NURSE NOTE - NSFALLRSKPASTHIST_ED_ALL_ED
Physical Therapy     Referred by: Manog Pan MD; Medical Diagnosis (from order):    Diagnosis Information      Diagnosis    V43.64 (ICD-9-CM) - Z96.649 (ICD-10-CM) - S/P revision of total hip                Daily Treatment Note -  Physical Therapy    Visit:  2   Next referring provider appointment: 8/9/2021    Patient alert and oriented X3.    SUBJECTIVE                                                                                                             Patient states she is feeling ok.  Turning in kitchen, which is small, reports she hears something in her hip.  Does not have pain with it.   Functional Change: No change  Pain / Symptoms:  Pain rating (out of 10): Current: 5 Location: Upper thigh anterior and lateral area patient rubs   Quality / Description:. Pull/tight     OBJECTIVE                                                                                                                     Observation:   Comments / Details: 2 brown spots noted anterior lateral thigh / mid thigh - patient reports these areas are sensitive and this was present after therapy      TREATMENT                                                                                                                  Therapeutic Exercise:  Instructed in turning body and feet together vs twisting over hip for joint protection    Instructed in use of rolling pin for self STM of quads, ITB for HEP - use of theraband muscle roller in clinic ; instructed in performing 2x/day for a few minutes at a time only    Seated AROM hip flexion as able, use of strap or t-band to assist the rest of the range, slow eccentric lowering from AAROM position back to start position with foot on floor x 5 reps     Standing hip flexor stretch: patient performs via heel raise and back extension: instructed to avoid this position to decrease back irritation - staggered stance standing position used with patient reports she does not feel as much stretch, but  no back pain reported    Use of heat to quads/ITB 15-20 minutes a day 2x/day    Manual Therapy:  STM to left rectus femoris, lateralis, ITB mid thigh up to origins at hip/pelvis supine with bolster under knees    Skilled input: verbal instruction/cues    Writer verbally educated and received verbal consent for hand placement, positioning of patient, and techniques to be performed today from patient for clothing adjustments for techniques, therapist position for techniques and hand placement and palpation for techniques as described above and how they are pertinent to the patient's plan of care.    Home Exercise Program: Self STM with rolling pin to left quads and ITB 2x/day a few minutes at a time  Hip flexion AROM as able, then AAROM with band, then slow eccentric lowering from AAROM position x 5 reps at a time 2-3x/day  Use of heat to quads/ITB     ASSESSMENT                                                                                                             Patient presents to therapy for follow up visit.  Addressed muscle tightness in quads and ITB on left with STM this date with patient stating front of thigh/hip felt looser with gait exiting clinic afterward.  Instructed in use of heat and self STM with rolling pin for a few minutes 2x/day for carryover at home.  Will need to address hip strength as well.    Patient Education:   Results of above outlined education: Verbalizes understanding and Needs reinforcement      PLAN                                                                                                                           Suggestions for next session as indicated: Progress per plan of care, continue STM, hip flexor stretching/quad stretching, hip strengthening (hip abduction, extension)         Therapy procedure time and total treatment time can be found documented on the Time Entry flowsheet   no

## 2022-07-17 NOTE — ED ADULT NURSE NOTE - OBJECTIVE STATEMENT
Pt is a 48 y/o male A&Ox4 in NAD ambulatory with steady gait c/o dizziness and left neck tenderness x 3 days. Pt denies unilateral weakness, vision changes, numbness/tingling. Upon assessment no drift or facial droop noted. Pt talking in clear, full sentences, respirations even and unlabored, EKG done and signed.

## 2022-07-17 NOTE — ED PROVIDER NOTE - CLINICAL SUMMARY MEDICAL DECISION MAKING FREE TEXT BOX
47M with a h/o vertigo, HTN, HLD, who p/w chronic ear ringing and vertigo/dizziness since Friday, states it feels like unsteadiness and room spinning, worse with movement, did have some nausea which has now resolved. ALso c/o chronic left neck pain, no headache.  Usually vertigo sx go away but this time lasted longer prompting ER visit. Pt did not take anything for his sx. Denies n/t/w in ext, no cp/sob, no syncope, no f/c, no leg swelling, no other complaints.   Pt is well-appearing on exam, VSS, no focal neuro deficits, EKG NSR, no ischemia   Plan for labs, ivfs, meclizine, reglan, reassess.

## 2022-07-17 NOTE — ED PROVIDER NOTE - PHYSICAL EXAMINATION
GEN: Well appearing, well developed, awake, alert, oriented to person, place, time/situation and in no apparent distress. NTAF  ENT: Airway patent, Nasal mucosa clear. Mouth with normal mucosa. TMs clear b/l.   EYES: Clear bilaterally. PERRL, EOMI  RESPIRATORY: Breathing comfortably with normal RR. No W/C/R, no hypoxia or resp distress.  CARDIAC: Regular rate and rhythm, no M/R/G  ABDOMEN: Soft, nontender, +bowel sounds, no rebound, rigidity, or guarding.  MSK: Range of motion is not limited, no deformities noted.  NEURO: Alert and oriented x 3. Cn 2-12 intact. Strength 5/5 and sensation intact in all 4 extremities. no pronator drift. FTN normal.  Gait normal.   SKIN: Skin normal color for race, warm, dry and intact. No evidence of rash.  PSYCH: Alert and oriented to person, place, time/situation. normal mood and affect. no apparent risk to self or others.

## 2022-07-17 NOTE — ED PROVIDER NOTE - PROGRESS NOTE DETAILS
Labs wnl, pt still dizzy though. Will get CT/CTA for further eval (pt reports he has never had imaging before for his vertigo).  Pt signed to night MD pending CT results and reassessment.

## 2022-07-17 NOTE — ED ADULT NURSE REASSESSMENT NOTE - NS ED NURSE REASSESS COMMENT FT1
Patient received in bed axox3, spont breathing on RA. Pt presenting to the ED for lightheadedness and dizziness x few days. Pt denies falls, no HA at this time, awaiting re eval by MD and imaging reads, pt updated on status.

## 2023-02-07 NOTE — ED PROVIDER NOTE - CCCP TRG CHIEF CMPLNT
On exam noted bilateral cerumen impaction and unable to fully view tympanic membrane  Cerumen impaction removed bilateral eac with alligator forceps, curette, and suction, pt tolerated procedure well  Upon removal, improved hearing and decreased clogged sensation of bilateral ears  Encourage ongoing follow up annually to monitor for cerumen and hearing 
Use of  via phone for in depth discussion    TSh level 0 886 - 12/2022  Thyroid US 01/23/2023 indicating   Right lobe: 4 7 x 2 3 x 2 1 cm  Volume 11 1 mL  Left lobe:  4 5 x 1 7 x 1 7 cm  Volume 6 1 mL  Isthmus: 0 5  cm  RIGHT midgland nodule measuring 1 6 x 1 4 x 1 6 cm  Given differences in measuring technique, no significant change from prior  TR 4 (4-6 points), FNA if > 1 5 cm  Follow if > 1cm  LEFT lower pole nodule measuring 1 2 x 0 8 x 1 cm  Given differences in measuring technique, no significant change from prior  TR 3 (3 points), FNA if >2 5 cm  Follow if >1 5 cm  FNAB 03/2022 of the Right nodule indicating [place] III, Afirma Benign    Discussed [place] rating system, EZRA guidelines, and indications for further interventions  Reviewed options for treatment including repeat ultrasound in one year, repeat FNAB of the nodule with Afirma [facility]  Surgery not currently indicated  Based on guidelines for EZRA, thyroid us in year or two years is reasonable due to FNA afirma Benign        After discussion agreed to repeat thyroid US in one year  Follow up after testing January 2024
dizziness

## 2023-04-17 NOTE — ED ADULT NURSE NOTE - CAS TRG GEN SKIN CONDITION
Is the patient currently in the state of [place]? YES    Visit mode:VIDEO    If the visit is dropped, the patient can be reconnected by: VIDEO VISIT: Send to e-mail at: [email or web address]    Will anyone else be joining the visit? NO      How would you like to obtain your AVS? Mail a copy    Are changes needed to the allergy or medication list? YES: Due to time, we didn't go over any medications.  Pt states having seasonal allergies.  Please add.    Reason for visit: Video Visit    Date of pt reported vitals: estimation  Pain: no  Domonique Chin        Warm/Dry

## 2023-11-20 ENCOUNTER — EMERGENCY (EMERGENCY)
Facility: HOSPITAL | Age: 48
LOS: 1 days | Discharge: HOME | End: 2023-11-20
Attending: STUDENT IN AN ORGANIZED HEALTH CARE EDUCATION/TRAINING PROGRAM | Admitting: STUDENT IN AN ORGANIZED HEALTH CARE EDUCATION/TRAINING PROGRAM
Payer: COMMERCIAL

## 2023-11-20 VITALS
DIASTOLIC BLOOD PRESSURE: 87 MMHG | OXYGEN SATURATION: 97 % | RESPIRATION RATE: 18 BRPM | SYSTOLIC BLOOD PRESSURE: 144 MMHG | TEMPERATURE: 98 F | HEART RATE: 95 BPM

## 2023-11-20 VITALS
DIASTOLIC BLOOD PRESSURE: 97 MMHG | RESPIRATION RATE: 18 BRPM | TEMPERATURE: 98 F | SYSTOLIC BLOOD PRESSURE: 152 MMHG | HEART RATE: 103 BPM | OXYGEN SATURATION: 100 % | WEIGHT: 265 LBS

## 2023-11-20 LAB
ANION GAP SERPL CALC-SCNC: 10 MMOL/L — SIGNIFICANT CHANGE UP (ref 5–17)
ANION GAP SERPL CALC-SCNC: 10 MMOL/L — SIGNIFICANT CHANGE UP (ref 5–17)
BASOPHILS # BLD AUTO: 0.02 K/UL — SIGNIFICANT CHANGE UP (ref 0–0.2)
BASOPHILS # BLD AUTO: 0.02 K/UL — SIGNIFICANT CHANGE UP (ref 0–0.2)
BASOPHILS NFR BLD AUTO: 0.3 % — SIGNIFICANT CHANGE UP (ref 0–2)
BASOPHILS NFR BLD AUTO: 0.3 % — SIGNIFICANT CHANGE UP (ref 0–2)
BUN SERPL-MCNC: 14 MG/DL — SIGNIFICANT CHANGE UP (ref 7–23)
BUN SERPL-MCNC: 14 MG/DL — SIGNIFICANT CHANGE UP (ref 7–23)
CALCIUM SERPL-MCNC: 9.8 MG/DL — SIGNIFICANT CHANGE UP (ref 8.4–10.5)
CALCIUM SERPL-MCNC: 9.8 MG/DL — SIGNIFICANT CHANGE UP (ref 8.4–10.5)
CHLORIDE SERPL-SCNC: 100 MMOL/L — SIGNIFICANT CHANGE UP (ref 96–108)
CHLORIDE SERPL-SCNC: 100 MMOL/L — SIGNIFICANT CHANGE UP (ref 96–108)
CO2 SERPL-SCNC: 29 MMOL/L — SIGNIFICANT CHANGE UP (ref 22–31)
CO2 SERPL-SCNC: 29 MMOL/L — SIGNIFICANT CHANGE UP (ref 22–31)
CREAT SERPL-MCNC: 1.05 MG/DL — SIGNIFICANT CHANGE UP (ref 0.5–1.3)
CREAT SERPL-MCNC: 1.05 MG/DL — SIGNIFICANT CHANGE UP (ref 0.5–1.3)
EGFR: 89 ML/MIN/1.73M2 — SIGNIFICANT CHANGE UP
EGFR: 89 ML/MIN/1.73M2 — SIGNIFICANT CHANGE UP
EOSINOPHIL # BLD AUTO: 0.08 K/UL — SIGNIFICANT CHANGE UP (ref 0–0.5)
EOSINOPHIL # BLD AUTO: 0.08 K/UL — SIGNIFICANT CHANGE UP (ref 0–0.5)
EOSINOPHIL NFR BLD AUTO: 1.3 % — SIGNIFICANT CHANGE UP (ref 0–6)
EOSINOPHIL NFR BLD AUTO: 1.3 % — SIGNIFICANT CHANGE UP (ref 0–6)
GLUCOSE SERPL-MCNC: 112 MG/DL — HIGH (ref 70–99)
GLUCOSE SERPL-MCNC: 112 MG/DL — HIGH (ref 70–99)
HCT VFR BLD CALC: 42.8 % — SIGNIFICANT CHANGE UP (ref 39–50)
HCT VFR BLD CALC: 42.8 % — SIGNIFICANT CHANGE UP (ref 39–50)
HGB BLD-MCNC: 15 G/DL — SIGNIFICANT CHANGE UP (ref 13–17)
HGB BLD-MCNC: 15 G/DL — SIGNIFICANT CHANGE UP (ref 13–17)
IMM GRANULOCYTES NFR BLD AUTO: 0.2 % — SIGNIFICANT CHANGE UP (ref 0–0.9)
IMM GRANULOCYTES NFR BLD AUTO: 0.2 % — SIGNIFICANT CHANGE UP (ref 0–0.9)
LYMPHOCYTES # BLD AUTO: 1.99 K/UL — SIGNIFICANT CHANGE UP (ref 1–3.3)
LYMPHOCYTES # BLD AUTO: 1.99 K/UL — SIGNIFICANT CHANGE UP (ref 1–3.3)
LYMPHOCYTES # BLD AUTO: 32.9 % — SIGNIFICANT CHANGE UP (ref 13–44)
LYMPHOCYTES # BLD AUTO: 32.9 % — SIGNIFICANT CHANGE UP (ref 13–44)
MCHC RBC-ENTMCNC: 29.9 PG — SIGNIFICANT CHANGE UP (ref 27–34)
MCHC RBC-ENTMCNC: 29.9 PG — SIGNIFICANT CHANGE UP (ref 27–34)
MCHC RBC-ENTMCNC: 35 GM/DL — SIGNIFICANT CHANGE UP (ref 32–36)
MCHC RBC-ENTMCNC: 35 GM/DL — SIGNIFICANT CHANGE UP (ref 32–36)
MCV RBC AUTO: 85.4 FL — SIGNIFICANT CHANGE UP (ref 80–100)
MCV RBC AUTO: 85.4 FL — SIGNIFICANT CHANGE UP (ref 80–100)
MONOCYTES # BLD AUTO: 0.52 K/UL — SIGNIFICANT CHANGE UP (ref 0–0.9)
MONOCYTES # BLD AUTO: 0.52 K/UL — SIGNIFICANT CHANGE UP (ref 0–0.9)
MONOCYTES NFR BLD AUTO: 8.6 % — SIGNIFICANT CHANGE UP (ref 2–14)
MONOCYTES NFR BLD AUTO: 8.6 % — SIGNIFICANT CHANGE UP (ref 2–14)
NEUTROPHILS # BLD AUTO: 3.43 K/UL — SIGNIFICANT CHANGE UP (ref 1.8–7.4)
NEUTROPHILS # BLD AUTO: 3.43 K/UL — SIGNIFICANT CHANGE UP (ref 1.8–7.4)
NEUTROPHILS NFR BLD AUTO: 56.7 % — SIGNIFICANT CHANGE UP (ref 43–77)
NEUTROPHILS NFR BLD AUTO: 56.7 % — SIGNIFICANT CHANGE UP (ref 43–77)
NRBC # BLD: 0 /100 WBCS — SIGNIFICANT CHANGE UP (ref 0–0)
NRBC # BLD: 0 /100 WBCS — SIGNIFICANT CHANGE UP (ref 0–0)
PLATELET # BLD AUTO: 217 K/UL — SIGNIFICANT CHANGE UP (ref 150–400)
PLATELET # BLD AUTO: 217 K/UL — SIGNIFICANT CHANGE UP (ref 150–400)
POTASSIUM SERPL-MCNC: 4.7 MMOL/L — SIGNIFICANT CHANGE UP (ref 3.5–5.3)
POTASSIUM SERPL-MCNC: 4.7 MMOL/L — SIGNIFICANT CHANGE UP (ref 3.5–5.3)
POTASSIUM SERPL-SCNC: 4.7 MMOL/L — SIGNIFICANT CHANGE UP (ref 3.5–5.3)
POTASSIUM SERPL-SCNC: 4.7 MMOL/L — SIGNIFICANT CHANGE UP (ref 3.5–5.3)
RBC # BLD: 5.01 M/UL — SIGNIFICANT CHANGE UP (ref 4.2–5.8)
RBC # BLD: 5.01 M/UL — SIGNIFICANT CHANGE UP (ref 4.2–5.8)
RBC # FLD: 12.7 % — SIGNIFICANT CHANGE UP (ref 10.3–14.5)
RBC # FLD: 12.7 % — SIGNIFICANT CHANGE UP (ref 10.3–14.5)
SODIUM SERPL-SCNC: 139 MMOL/L — SIGNIFICANT CHANGE UP (ref 135–145)
SODIUM SERPL-SCNC: 139 MMOL/L — SIGNIFICANT CHANGE UP (ref 135–145)
TROPONIN T, HIGH SENSITIVITY RESULT: <6 NG/L — SIGNIFICANT CHANGE UP (ref 0–51)
TROPONIN T, HIGH SENSITIVITY RESULT: <6 NG/L — SIGNIFICANT CHANGE UP (ref 0–51)
WBC # BLD: 6.05 K/UL — SIGNIFICANT CHANGE UP (ref 3.8–10.5)
WBC # BLD: 6.05 K/UL — SIGNIFICANT CHANGE UP (ref 3.8–10.5)
WBC # FLD AUTO: 6.05 K/UL — SIGNIFICANT CHANGE UP (ref 3.8–10.5)
WBC # FLD AUTO: 6.05 K/UL — SIGNIFICANT CHANGE UP (ref 3.8–10.5)

## 2023-11-20 PROCEDURE — 71045 X-RAY EXAM CHEST 1 VIEW: CPT | Mod: 26

## 2023-11-20 PROCEDURE — 80048 BASIC METABOLIC PNL TOTAL CA: CPT

## 2023-11-20 PROCEDURE — 82962 GLUCOSE BLOOD TEST: CPT

## 2023-11-20 PROCEDURE — 99285 EMERGENCY DEPT VISIT HI MDM: CPT

## 2023-11-20 PROCEDURE — 99283 EMERGENCY DEPT VISIT LOW MDM: CPT | Mod: 25

## 2023-11-20 PROCEDURE — 85025 COMPLETE CBC W/AUTO DIFF WBC: CPT

## 2023-11-20 PROCEDURE — 84484 ASSAY OF TROPONIN QUANT: CPT

## 2023-11-20 PROCEDURE — 36415 COLL VENOUS BLD VENIPUNCTURE: CPT

## 2023-11-20 PROCEDURE — 71045 X-RAY EXAM CHEST 1 VIEW: CPT

## 2023-11-20 NOTE — ED ADULT NURSE NOTE - NSFALLUNIVINTERV_ED_ALL_ED
Bed/Stretcher in lowest position, wheels locked, appropriate side rails in place/Call bell, personal items and telephone in reach/Instruct patient to call for assistance before getting out of bed/chair/stretcher/Non-slip footwear applied when patient is off stretcher/[place] to call system/Physically safe environment - no spills, clutter or unnecessary equipment/Purposeful proactive rounding/Room/bathroom lighting operational, light cord in reach

## 2023-11-20 NOTE — ED ADULT NURSE NOTE - CHPI ED NUR SYMPTOMS NEG
no blurred vision/no change in level of consciousness/no confusion/no loss of consciousness/no vomiting

## 2023-11-20 NOTE — ED PROVIDER NOTE - NS ED ROS FT
Constitutional: No fever or chills, + lightheadedness  Eyes: No discharge or drainage  Ears, Nose, Mouth, Throat: No nasal discharge, no sore throat  Cardiovascular: No chest pain, no palpitations  Respiratory: No shortness of breath, no cough  Gastrointestinal: No nausea or vomiting, no abdominal pain, no diarrhea or constipation  Musculoskeletal: No joint pain, no swelling  Skin: No rashes or lesions  Neurological: No numbness, weakness, tingling, no headache  Psychiatric: No depression

## 2023-11-20 NOTE — ED PROVIDER NOTE - OBJECTIVE STATEMENT
48-year-old male with history of hypertension hyperlipidemia presenting with lightheadedness.  Patient states he felt off this morning, had some right ear ringing and some lightheadedness, describes that he wanted to pass out with associated midsternal chest discomfort, now resolved.  Patient was concerned because he checked his blood pressure and his blood pressure was systolic 150s over diastolic 110s.  No chest pain currently.  No blurry vision, no nausea or vomiting, no numbness or weakness or tingling.  ROS as above.

## 2023-11-20 NOTE — ED PROVIDER NOTE - CLINICAL SUMMARY MEDICAL DECISION MAKING FREE TEXT BOX
48-year-old male presenting with lightheadedness, chest discomfort, EKG nonischemic, well-appearing, exam nonfocal, will check troponin for rule out ACS, chest x-ray, reassess.

## 2023-11-20 NOTE — ED PROVIDER NOTE - PATIENT PORTAL LINK FT
You can access the FollowMyHealth Patient Portal offered by [facility] by registering at the following website: [email or web address]. By joining [employer]’s FollowMyHealth portal, you will also be able to view your health information using other applications (apps) compatible with our system.

## 2023-11-20 NOTE — ED PROVIDER NOTE - PHYSICAL EXAMINATION
general: Well appearing, in no acute distress  HEENT: Normocephalic, atraumatic, extraocular movements intact  CV: Regular rate  Pulm: No respiratory distress, no tachypnea  Abd: Flat, no gross distension  Ext: warm and well perfused,  2+ symmetric radial pulses bilaterally  Skin: No gross rashes or lesions  Neuro: Alert and oriented, moving all extremities, CN II-XII intact,  sensation intact, motor intact bilaterally, normal gait

## 2023-11-20 NOTE — ED ADULT TRIAGE NOTE - CHIEF COMPLAINT QUOTE
pt presents to ER c/o feeling lightheaded with R ear ringing sensation and a "clogged" sensation to L ear. also c/o some chest pressure. states he has been feeling these symptoms for the past few hours.

## 2023-11-20 NOTE — ED ADULT NURSE NOTE - GLASGOW COMA SCALE: EYE OPENING
"Now I'm hearing knocking sounds that I'm sure aren't actually there."  Paranoid (E4) spontaneous Continued VH I still see shadows "I feel good, I slept well."

## 2023-11-20 NOTE — ED ADULT NURSE NOTE - OBJECTIVE STATEMENT
Pt presents to ED c/o lightheadedness, right ear ringing, chest pressure since this morning. noticed these symptoms when woke up this morning. checked BP at home. hx of HTN on lisinopril. BP was elevated on home reading, took home medications as prescribed. Felt slightly better but still having symptoms. 12 lead EKG done. Pt A&Ox4, ambulatory with steady gait, speaking in clear/full sentences, no acute distress. BP noted to be elevated. denies room spinning dizziness, unsteadiness, weakness, numbness, shortness of breath, fever, cough.

## 2023-11-22 DIAGNOSIS — R42 DIZZINESS AND GIDDINESS: ICD-10-CM

## 2023-11-22 DIAGNOSIS — E78.5 HYPERLIPIDEMIA, UNSPECIFIED: ICD-10-CM

## 2023-11-22 DIAGNOSIS — Z88.1 ALLERGY STATUS TO OTHER ANTIBIOTIC AGENTS STATUS: ICD-10-CM

## 2023-11-22 DIAGNOSIS — I10 ESSENTIAL (PRIMARY) HYPERTENSION: ICD-10-CM

## 2023-11-22 DIAGNOSIS — Z88.0 ALLERGY STATUS TO PENICILLIN: ICD-10-CM

## 2023-11-22 DIAGNOSIS — H93.11 TINNITUS, RIGHT EAR: ICD-10-CM

## 2024-01-24 ENCOUNTER — EMERGENCY (EMERGENCY)
Facility: HOSPITAL | Age: 49
LOS: 1 days | Discharge: HOME | End: 2024-01-24
Attending: STUDENT IN AN ORGANIZED HEALTH CARE EDUCATION/TRAINING PROGRAM | Admitting: STUDENT IN AN ORGANIZED HEALTH CARE EDUCATION/TRAINING PROGRAM
Payer: COMMERCIAL

## 2024-01-24 VITALS — RESPIRATION RATE: 18 BRPM | HEART RATE: 95 BPM | OXYGEN SATURATION: 97 %

## 2024-01-24 VITALS
HEIGHT: 75 IN | SYSTOLIC BLOOD PRESSURE: 162 MMHG | HEART RATE: 115 BPM | RESPIRATION RATE: 18 BRPM | TEMPERATURE: 98 F | DIASTOLIC BLOOD PRESSURE: 88 MMHG | OXYGEN SATURATION: 100 % | WEIGHT: 265 LBS

## 2024-01-24 DIAGNOSIS — I10 ESSENTIAL (PRIMARY) HYPERTENSION: ICD-10-CM

## 2024-01-24 DIAGNOSIS — Z88.0 ALLERGY STATUS TO PENICILLIN: ICD-10-CM

## 2024-01-24 DIAGNOSIS — M54.50 LOW BACK PAIN, UNSPECIFIED: ICD-10-CM

## 2024-01-24 DIAGNOSIS — Z88.1 ALLERGY STATUS TO OTHER ANTIBIOTIC AGENTS STATUS: ICD-10-CM

## 2024-01-24 PROCEDURE — 99283 EMERGENCY DEPT VISIT LOW MDM: CPT | Mod: 25

## 2024-01-24 PROCEDURE — 96372 THER/PROPH/DIAG INJ SC/IM: CPT

## 2024-01-24 PROCEDURE — 99284 EMERGENCY DEPT VISIT MOD MDM: CPT

## 2024-01-24 RX ORDER — LIDOCAINE 4 G/100G
1 CREAM TOPICAL ONCE
Refills: 0 | Status: COMPLETED | OUTPATIENT
Start: 2024-01-24 | End: 2024-01-24

## 2024-01-24 RX ORDER — METHOCARBAMOL 500 MG/1
1500 TABLET, FILM COATED ORAL ONCE
Refills: 0 | Status: COMPLETED | OUTPATIENT
Start: 2024-01-24 | End: 2024-01-24

## 2024-01-24 RX ORDER — KETOROLAC TROMETHAMINE 30 MG/ML
15 SYRINGE (ML) INJECTION ONCE
Refills: 0 | Status: DISCONTINUED | OUTPATIENT
Start: 2024-01-24 | End: 2024-01-24

## 2024-01-24 RX ORDER — LIDOCAINE 4 G/100G
1 CREAM TOPICAL
Qty: 7 | Refills: 0
Start: 2024-01-24 | End: 2024-01-30

## 2024-01-24 RX ORDER — METHOCARBAMOL 500 MG/1
2 TABLET, FILM COATED ORAL
Qty: 30 | Refills: 0
Start: 2024-01-24 | End: 2024-01-28

## 2024-01-24 RX ORDER — MIRTAZAPINE 45 MG/1
0 TABLET, ORALLY DISINTEGRATING ORAL
Qty: 0 | Refills: 0 | DISCHARGE

## 2024-01-24 RX ORDER — IBUPROFEN 200 MG
1 TABLET ORAL
Qty: 50 | Refills: 0
Start: 2024-01-24

## 2024-01-24 RX ADMIN — LIDOCAINE 1 PATCH: 4 CREAM TOPICAL at 21:12

## 2024-01-24 RX ADMIN — Medication 15 MILLIGRAM(S): at 21:12

## 2024-01-24 NOTE — ED PROVIDER NOTE - OBJECTIVE STATEMENT
patient is a 48 year old male with PMH of hypertension, presenting for sudden onset lower back pain with movement. Patient states that earlier today, he went ot  his 20 lb dog and immediately after he said he had sudden onset lower back pain near his tailbone that was 8/10 in intensity and sharp, stabbing in quality. It does not radiate anywhere, has not gotten worse or better. The pain is not present at rest, and "takes his breath away" when he moves.     Denies fall, LOC, headstrike, HA, vision changes, n/v/d/c/f, numbness or tingling, chest pain, SOB, dizziness, diaphroesis.

## 2024-01-24 NOTE — ED PROVIDER NOTE - PATIENT PORTAL LINK FT
You can access the FollowMyHealth Patient Portal offered by [facility] by registering at the following website: [email or web address]. By joining [employer]’s FollowMyHealth portal, you will also be able to view your health information using other applications (apps) compatible with our system.

## 2024-01-24 NOTE — ED ADULT TRIAGE NOTE - CHIEF COMPLAINT QUOTE
sudden onset severe lower back pain after picking up his dog. No radiation, no n/t down legs, loss of continence, of hx of back injury, herniated discs. Ambulatory in triage.

## 2024-01-24 NOTE — ED ADULT NURSE NOTE - OBJECTIVE STATEMENT
47y Male A&ox3 to ED c/o lower back pain with movement after picking up dag. Denies numbness, tingling, loss of bowel/ blader control, nor fall. Ambulates with a steady gait. Full range of motion in all 4 exterminates.

## 2024-01-24 NOTE — ED PROVIDER NOTE - ATTENDING CONTRIBUTION TO CARE
lifted his dog today and afterwards having pain across b/l lower back  pain non-radiating, no falls, no trauma  No fevers or chills, no numbness/tingling/weakness of extremities. No bladder or bowel incontinence or retention, no saddle anesthesia.     on exam well appearing  no midline ttp or stepoffs, +mild ttp to soft tissues across low back muscles  5/5 strength to b/l LE, sensation intact, normal gait unassisted    No red flags concerning for spinal cord pathology/cauda equina, no indication for ED MRI  neuro intact and ambulatory  suspect low back pain 2/2 MSK vs possibly herniated disc but less likely as pain not radiating  plan for pain control, discussed need for outpt f/u w/ PCP if pain persists may need PT

## 2024-01-24 NOTE — ED PROVIDER NOTE - CLINICAL SUMMARY MEDICAL DECISION MAKING FREE TEXT BOX
46 year old male with PMH of Htn presenting for sudden onset lower back pain following lifting his dog  all other ROS negative  less likely aortic dissection as pain is not tearing  VS  /88 T98.3   PE +TENDERNESS in paraspinal muscles of lower back    PLAN  pain control

## 2024-06-18 NOTE — ED PROVIDER NOTE - IV ALTEPLASE EXCL ABS HIDDEN
Medication:   Requested Prescriptions     Pending Prescriptions Disp Refills    lisinopril (PRINIVIL;ZESTRIL) 20 MG tablet [Pharmacy Med Name: Lisinopril Oral Tablet 20 MG] 90 tablet 0     Sig: TAKE 1 TABLET BY MOUTH EVERY DAY    Continuous Glucose Sensor (FREESTYLE DEDRICK 2 SENSOR) MIS [Pharmacy Med Name: FreeStyle Dedrick 2 Sensor Miscellaneous]  0     Sig: USE AS DIRECTED TO MONITOR BLOOD GLUCOSE DAILY. REPLACE SENSOR EVERY 14 DAYS    JARDIANCE 25 MG tablet [Pharmacy Med Name: Jardiance Oral Tablet 25 MG] 90 tablet 0     Sig: TAKE 1 TABLET BY MOUTH EVERY DAY    LANTUS SOLOSTAR 100 UNIT/ML injection pen [Pharmacy Med Name: Lantus SoloStar Subcutaneous Solution Pen-injector 100 UNIT/ML] 30 mL 0     Sig: INJECT 45 UNITS INTO THE SKIN NIGHTLY    metFORMIN (GLUCOPHAGE) 1000 MG tablet [Pharmacy Med Name: metFORMIN HCl Oral Tablet 1000 MG] 180 tablet 0     Sig: TAKE 1 TABLET BY MOUTH 2 TIMES A DAY WITH MEALS    TRULICITY 3 MG/0.5ML SOPN [Pharmacy Med Name: Trulicity Subcutaneous Solution Pen-injector 3 MG/0.5ML] 6 mL 0     Sig: INJECT 1 PEN SUBCUTANEOUSLY EVERY WEEK       Last Filled:  8/25/2023, 90, 2  8/25/2023, 4, 3  8/25/2023, 90, 2  8/25/2023, 10, 2  8/25/2023, 180, 2  8/25/2023, 12, 1    Patient Phone Number: [phone] (home)     Last appt: 8/2/2023   Next appt: LVMTCB due for dm and awv please make 40 minutes    Lab Results   Component Value Date     12/02/2022    K 4.2 12/02/2022     12/02/2022    CO2 29 12/02/2022    BUN 14 12/02/2022    CREATININE 1.0 12/02/2022    GLUCOSE 99 08/31/2018    CALCIUM 9.9 12/02/2022    BILITOT 0.3 12/02/2022    ALKPHOS 110 12/02/2022    AST 18 12/02/2022    ALT 22 12/02/2022    LABGLOM >60 12/02/2022    GFRAA >60 08/31/2018    AGRATIO 1.5 12/02/2022    GLOB 3.1 08/31/2018     Last Labs DM:   Lab Results   Component Value Date/Time    LABA1C 7.2 08/02/2023 04:23 PM             
show

## 2024-06-27 NOTE — PROGRESS NOTE BEHAVIORAL HEALTH - NSBHTIMEBASEDBILLING_PSY_A_CORE
Patient booked via [employer]        Message    Appointment For: Candelaria Rogerbraydon Solomon ([id number])   Visit Type: AstechHART VIDEO VISIT ([id number])      7/8/2024     4:00 PM  15 mins.  Carl Naik              [facility]      Patient Comments:   Carpal tunnel issue. Right fingers often feel numbness and tingling. Anxiety. Excess sweating plams.     
See telephone encounter [id number].    
yes...

## 2024-11-15 NOTE — BEHAVIORAL HEALTH ASSESSMENT NOTE - THOUGHT ASSOCIATIONS
Normal
Anibal Murry
Normal vision: sees adequately in most situations; can see medication labels, newsprint

## 2025-01-12 ENCOUNTER — EMERGENCY (EMERGENCY)
Facility: HOSPITAL | Age: 50
LOS: 1 days | Discharge: HOME | End: 2025-01-12
Admitting: EMERGENCY MEDICINE
Payer: COMMERCIAL

## 2025-01-12 VITALS
DIASTOLIC BLOOD PRESSURE: 90 MMHG | TEMPERATURE: 98 F | HEIGHT: 76 IN | SYSTOLIC BLOOD PRESSURE: 129 MMHG | RESPIRATION RATE: 16 BRPM | HEART RATE: 100 BPM | OXYGEN SATURATION: 98 % | WEIGHT: 265 LBS

## 2025-01-12 DIAGNOSIS — M54.50 LOW BACK PAIN, UNSPECIFIED: ICD-10-CM

## 2025-01-12 DIAGNOSIS — Z88.1 ALLERGY STATUS TO OTHER ANTIBIOTIC AGENTS: ICD-10-CM

## 2025-01-12 DIAGNOSIS — Z88.0 ALLERGY STATUS TO PENICILLIN: ICD-10-CM
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
## 2025-01-12 PROCEDURE — 96372 THER/PROPH/DIAG INJ SC/IM: CPT

## 2025-01-12 PROCEDURE — 99284 EMERGENCY DEPT VISIT MOD MDM: CPT | Mod: 25

## 2025-01-12 PROCEDURE — 99284 EMERGENCY DEPT VISIT MOD MDM: CPT

## 2025-01-12 PROCEDURE — 72131 CT LUMBAR SPINE W/O DYE: CPT | Mod: MC

## 2025-01-12 PROCEDURE — 72131 CT LUMBAR SPINE W/O DYE: CPT | Mod: 26

## 2025-01-12 RX ORDER — LIDOCAINE 50 MG/G
1 OINTMENT TOPICAL
Qty: 7 | Refills: 0
Start: 2025-01-12 | End: 2025-01-18

## 2025-01-12 RX ORDER — CYCLOBENZAPRINE HCL 10 MG
1 TABLET ORAL
Qty: 21 | Refills: 0
Start: 2025-01-12 | End: 2025-01-18

## 2025-01-12 RX ORDER — CYCLOBENZAPRINE HCL 10 MG
10 TABLET ORAL ONCE
Refills: 0 | Status: COMPLETED | OUTPATIENT
Start: 2025-01-12 | End: 2025-01-12

## 2025-01-12 RX ORDER — KETOROLAC TROMETHAMINE 30 MG/ML
30 INJECTION INTRAMUSCULAR; INTRAVENOUS ONCE
Refills: 0 | Status: DISCONTINUED | OUTPATIENT
Start: 2025-01-12 | End: 2025-01-12

## 2025-01-12 RX ORDER — LIDOCAINE 50 MG/G
1 OINTMENT TOPICAL ONCE
Refills: 0 | Status: COMPLETED | OUTPATIENT
Start: 2025-01-12 | End: 2025-01-12

## 2025-01-12 RX ORDER — DICLOFENAC SODIUM 75 MG
1 TABLET, DELAYED RELEASE (ENTERIC COATED) ORAL
Qty: 21 | Refills: 0
Start: 2025-01-12 | End: 2025-01-18

## 2025-01-12 RX ADMIN — KETOROLAC TROMETHAMINE 30 MILLIGRAM(S): 30 INJECTION INTRAMUSCULAR; INTRAVENOUS at 18:15

## 2025-01-12 RX ADMIN — Medication 10 MILLIGRAM(S): at 17:33

## 2025-01-12 RX ADMIN — KETOROLAC TROMETHAMINE 30 MILLIGRAM(S): 30 INJECTION INTRAMUSCULAR; INTRAVENOUS at 17:34

## 2025-01-12 RX ADMIN — LIDOCAINE 1 PATCH: 50 OINTMENT TOPICAL at 17:34

## 2025-01-12 RX ADMIN — LIDOCAINE 1 PATCH: 50 OINTMENT TOPICAL at 20:51

## 2025-01-12 NOTE — ED PROVIDER NOTE - CARE PROVIDER_API CALL
Noel Singer  Orthopaedic Surgery  [address]  [place] [address]  Phone: [phone]  Fax: [phone]  Follow Up Time:     Pasquale Webber  Orthopaedic Surgery  [address]  [place] [address]  Phone: [phone]  Fax: [phone]  Follow Up Time:     Ritesh Petty  Spine Surgery  [address] [facility]  [place] [address]  Phone: [phone]  Fax: [phone]  Follow Up Time:

## 2025-01-12 NOTE — ED PROVIDER NOTE - CARE PROVIDERS DIRECT ADDRESSES
[email or web address]

## 2025-01-12 NOTE — ED ADULT NURSE NOTE - OBJECTIVE STATEMENT
Pt A&Ox4 presents to ED with complaints of lower back pain. Reports mechanical fall in December and has had lower back pain since then. Denies LOC/headstrike. Pain worse with movement. Denies numbness/tingling, urinary symptoms. Ambulates with steady gait.

## 2025-01-12 NOTE — ED PROVIDER NOTE - PROVIDER TOKENS
PROVIDER:[TOKEN:[[id number]]],PROVIDER:[TOKEN:[[id number]]],PROVIDER:[TOKEN:[[id number]]]

## 2025-01-12 NOTE — ED PROVIDER NOTE - CLINICAL SUMMARY MEDICAL DECISION MAKING FREE TEXT BOX
pt s/p fall 3 wks ago, c/o persistent pain to buttock/lower back since the fall, worse w/certain positions, no concern for cord compression or cauda equina, no risk factors for epidural abscess, suspect contusion vs coccyx fx, given pain meds w/good relief, ct                              , stable for dc, supportive care and pain control discussed, to f/u pmd or spine. pt understands and agrees w/plan, strict return precautions given pt s/p fall 3 wks ago, c/o persistent pain to buttock/lower back since the fall, worse w/certain positions, no concern for cord compression or cauda equina, no risk factors for epidural abscess, suspect contusion vs coccyx fx, given pain meds w/good relief, ct  neg, stable for dc, supportive care and pain control discussed, to f/u pmd or spine. pt understands and agrees w/plan, strict return precautions given

## 2025-01-12 NOTE — ED PROVIDER NOTE - PATIENT PORTAL LINK FT
You can access the FollowMyHealth Patient Portal offered by [facility] by registering at the following website: [email or web address]. By joining [employer]’s FollowMyHealth portal, you will also be able to view your health information using other applications (apps) compatible with our system.

## 2025-01-12 NOTE — ED PROVIDER NOTE - OBJECTIVE STATEMENT
The pt is a 50 y/o M, who presents to ED c/o LBP x 3 wks s/p fall (accidently fell off a couch). Pain to lower back/buttock, constant, dull, worse w/certain mov and positions, has taken OTC meds at times w/some relief. Denies head trauma, loc, cp, sob, decreased ROM, gait disturbances, loss of bowel or bladder control. numbness or tingling to toes, any other injuries.

## 2025-01-12 NOTE — ED PROVIDER NOTE - MUSCULOSKELETAL, MLM
no C/T/lumbar tend, + tend over sacrum, FROM, normal gait, LE w/FROM b/l, muscle strength 5/5 b/l, good resistance b/l, + light touch b/l

## 2025-01-12 NOTE — ED ADULT TRIAGE NOTE - CHIEF COMPLAINT QUOTE
pt reports he fell before [facility] and had lower back pain since then. Pain is intensifying. Denies hitting head and or LOC. states lower back hurts the most going from sitting to standing.

## 2025-01-13 PROBLEM — I10 ESSENTIAL (PRIMARY) HYPERTENSION: Chronic | Status: ACTIVE | Noted: 2024-01-24

## 2025-01-24 ENCOUNTER — NON-APPOINTMENT (OUTPATIENT)
Age: 50
End: 2025-01-24

## 2025-01-24 PROBLEM — Z00.00 ENCOUNTER FOR PREVENTIVE HEALTH EXAMINATION: Status: ACTIVE | Noted: 2025-01-24
